# Patient Record
Sex: MALE | Race: BLACK OR AFRICAN AMERICAN | NOT HISPANIC OR LATINO
[De-identification: names, ages, dates, MRNs, and addresses within clinical notes are randomized per-mention and may not be internally consistent; named-entity substitution may affect disease eponyms.]

---

## 2017-03-25 ENCOUNTER — RX RENEWAL (OUTPATIENT)
Age: 57
End: 2017-03-25

## 2017-09-29 ENCOUNTER — RX RENEWAL (OUTPATIENT)
Age: 57
End: 2017-09-29

## 2018-01-22 ENCOUNTER — RX RENEWAL (OUTPATIENT)
Age: 58
End: 2018-01-22

## 2018-11-16 ENCOUNTER — APPOINTMENT (OUTPATIENT)
Dept: FAMILY MEDICINE | Facility: HOSPITAL | Age: 58
End: 2018-11-16

## 2018-11-16 ENCOUNTER — LABORATORY RESULT (OUTPATIENT)
Age: 58
End: 2018-11-16

## 2018-11-16 ENCOUNTER — OUTPATIENT (OUTPATIENT)
Dept: OUTPATIENT SERVICES | Facility: HOSPITAL | Age: 58
LOS: 1 days | End: 2018-11-16
Payer: COMMERCIAL

## 2018-11-16 VITALS
BODY MASS INDEX: 36.04 KG/M2 | SYSTOLIC BLOOD PRESSURE: 173 MMHG | OXYGEN SATURATION: 98 % | RESPIRATION RATE: 14 BRPM | DIASTOLIC BLOOD PRESSURE: 93 MMHG | HEART RATE: 98 BPM | TEMPERATURE: 97.2 F | WEIGHT: 237 LBS

## 2018-11-16 VITALS — SYSTOLIC BLOOD PRESSURE: 150 MMHG | DIASTOLIC BLOOD PRESSURE: 80 MMHG

## 2018-11-16 DIAGNOSIS — Z00.00 ENCOUNTER FOR GENERAL ADULT MEDICAL EXAMINATION W/OUT ABNORMAL FINDINGS: ICD-10-CM

## 2018-11-16 DIAGNOSIS — Z00.00 ENCOUNTER FOR GENERAL ADULT MEDICAL EXAMINATION WITHOUT ABNORMAL FINDINGS: ICD-10-CM

## 2018-11-16 PROCEDURE — 86803 HEPATITIS C AB TEST: CPT

## 2018-11-16 PROCEDURE — 80053 COMPREHEN METABOLIC PANEL: CPT

## 2018-11-16 PROCEDURE — 84439 ASSAY OF FREE THYROXINE: CPT

## 2018-11-16 PROCEDURE — 82274 ASSAY TEST FOR BLOOD FECAL: CPT

## 2018-11-16 PROCEDURE — 84443 ASSAY THYROID STIM HORMONE: CPT

## 2018-11-16 PROCEDURE — G0463: CPT

## 2018-11-16 PROCEDURE — 80061 LIPID PANEL: CPT

## 2018-11-16 PROCEDURE — 90471 IMMUNIZATION ADMIN: CPT

## 2018-11-16 PROCEDURE — 83036 HEMOGLOBIN GLYCOSYLATED A1C: CPT

## 2018-11-16 NOTE — HISTORY OF PRESENT ILLNESS
[FreeTextEntry1] : annual visit [de-identified] : Pt is a 59 yo M w/ hx of htn and prediabetes who is here for his annual visit and a refill of his blood pressure medications. Pt states that he ran out of his medication last week and did not take them for two days and was able to get some from the pharmacy until he went to see his PCP. The pt restarted taking his medications 3 days go. Pt denies any headaches or any other symptoms of hypertension at this time. PT asked about testing for prostate cancer and explained that we usually do not do PSA as it can lead to more invasive procedures that end up being negative. Pt denies any symptoms of dribbling or difficulty urinating that would suggest prostate cancer. Explained that the risk outweigh the benefits and it isn't necessary since he is not experiencing any symptoms. The pt agreed not to do a PSA at this time.

## 2018-11-16 NOTE — PHYSICAL EXAM
[No Acute Distress] : no acute distress [Well Nourished] : well nourished [Well Developed] : well developed [Well-Appearing] : well-appearing [Normal Sclera/Conjunctiva] : normal sclera/conjunctiva [PERRL] : pupils equal round and reactive to light [Normal Oropharynx] : the oropharynx was normal [No Lymphadenopathy] : no lymphadenopathy [Fluctuant] : was fluctuant [Mass ___cm] : had a [unfilled]Ucm mass [Rubbery] : had a rubbery consistency [___] : a single ~M [unfilled] ~Ucm nodule palpated on the right lobe of the thyroid [No Respiratory Distress] : no respiratory distress  [Clear to Auscultation] : lungs were clear to auscultation bilaterally [No Accessory Muscle Use] : no accessory muscle use [Normal Rate] : normal rate  [Regular Rhythm] : with a regular rhythm [Normal S1, S2] : normal S1 and S2 [No Murmur] : no murmur heard [No Edema] : there was no peripheral edema [Soft] : abdomen soft [Non Tender] : non-tender [Non-distended] : non-distended [No Masses] : no abdominal mass palpated [Normal Posterior Cervical Nodes] : no posterior cervical lymphadenopathy [Normal Anterior Cervical Nodes] : no anterior cervical lymphadenopathy [Grossly Normal Strength/Tone] : grossly normal strength/tone [Normal Gait] : normal gait [Coordination Grossly Intact] : coordination grossly intact [No Focal Deficits] : no focal deficits [Normal Affect] : the affect was normal [Normal Insight/Judgement] : insight and judgment were intact [de-identified] : Thyroid nodule noted on the right side of the neck

## 2018-11-16 NOTE — ASSESSMENT
[FreeTextEntry1] : 59 yo M w/ hx of HTN and prediabetes who is here for an annual visit and was found to have a thyroid nodule\par Thyroid nodule\par -TSH and free T4\par -US of the thyroid\par \par HTN\par -Pt with blood pressure initially 173/93 with a repeat of 150/80 manually\par -refilled pt's amlodipine and losartan\par -follow up in 1 month to recheck blood pressure. If still elevated will consider adding another blood pressure medication.\par \par Health maintenance\par -CBC, CMP, lipid profile, hep c ab, hemoglobin A1c, FIT test\par \par Flu vaccine needed\par -flu vaccine offered and pt declined\par \par HIV screening\par offered and pt declined\par

## 2018-11-19 ENCOUNTER — FORM ENCOUNTER (OUTPATIENT)
Age: 58
End: 2018-11-19

## 2018-11-19 DIAGNOSIS — E04.1 NONTOXIC SINGLE THYROID NODULE: ICD-10-CM

## 2018-11-19 DIAGNOSIS — Z23 ENCOUNTER FOR IMMUNIZATION: ICD-10-CM

## 2018-11-19 LAB
CHOLEST SERPL-MCNC: 167 MG/DL
CHOLEST/HDLC SERPL: 3.5 RATIO
HCV AB SER QL: NONREACTIVE
HCV S/CO RATIO: 0.19 S/CO
HDLC SERPL-MCNC: 48 MG/DL
LDLC SERPL CALC-MCNC: 101 MG/DL
TRIGL SERPL-MCNC: 89 MG/DL

## 2018-11-20 ENCOUNTER — OUTPATIENT (OUTPATIENT)
Dept: OUTPATIENT SERVICES | Facility: HOSPITAL | Age: 58
LOS: 1 days | End: 2018-11-20
Payer: COMMERCIAL

## 2018-11-20 DIAGNOSIS — E04.1 NONTOXIC SINGLE THYROID NODULE: ICD-10-CM

## 2018-11-20 PROCEDURE — 76536 US EXAM OF HEAD AND NECK: CPT

## 2018-11-20 PROCEDURE — 76536 US EXAM OF HEAD AND NECK: CPT | Mod: 26

## 2018-12-02 LAB
ALBUMIN SERPL ELPH-MCNC: 4.7 G/DL
ALP BLD-CCNC: 50 U/L
ALT SERPL-CCNC: 14 U/L
ANION GAP SERPL CALC-SCNC: 14 MMOL/L
AST SERPL-CCNC: 18 U/L
BASOPHILS # BLD AUTO: 0.01 K/UL
BASOPHILS NFR BLD AUTO: 0.3 %
BILIRUB SERPL-MCNC: 0.7 MG/DL
BUN SERPL-MCNC: 16 MG/DL
CALCIUM SERPL-MCNC: 9.3 MG/DL
CHLORIDE SERPL-SCNC: 102 MMOL/L
CO2 SERPL-SCNC: 26 MMOL/L
CREAT SERPL-MCNC: 0.93 MG/DL
EOSINOPHIL # BLD AUTO: 0.09 K/UL
EOSINOPHIL NFR BLD AUTO: 2.6 %
GLUCOSE SERPL-MCNC: 114 MG/DL
HBA1C MFR BLD HPLC: 6.2 %
HCT VFR BLD CALC: 40.4 %
HEMOCCULT STL QL IA: NEGATIVE
HGB BLD-MCNC: 13.1 G/DL
IMM GRANULOCYTES NFR BLD AUTO: 0 %
LYMPHOCYTES # BLD AUTO: 1.43 K/UL
LYMPHOCYTES NFR BLD AUTO: 41.4 %
MAN DIFF?: NORMAL
MCHC RBC-ENTMCNC: 28.5 PG
MCHC RBC-ENTMCNC: 32.4 GM/DL
MCV RBC AUTO: 88 FL
MONOCYTES # BLD AUTO: 0.3 K/UL
MONOCYTES NFR BLD AUTO: 8.7 %
NEUTROPHILS # BLD AUTO: 1.62 K/UL
NEUTROPHILS NFR BLD AUTO: 47 %
PLATELET # BLD AUTO: 287 K/UL
POTASSIUM SERPL-SCNC: 3.9 MMOL/L
PROT SERPL-MCNC: 7.4 G/DL
RBC # BLD: 4.59 M/UL
RBC # FLD: 13.6 %
SODIUM SERPL-SCNC: 142 MMOL/L
TSH SERPL-ACNC: 0.18 UIU/ML
WBC # FLD AUTO: 3.45 K/UL

## 2018-12-17 ENCOUNTER — APPOINTMENT (OUTPATIENT)
Dept: FAMILY MEDICINE | Facility: HOSPITAL | Age: 58
End: 2018-12-17

## 2019-07-02 ENCOUNTER — APPOINTMENT (OUTPATIENT)
Dept: FAMILY MEDICINE | Facility: HOSPITAL | Age: 59
End: 2019-07-02

## 2019-07-02 ENCOUNTER — OUTPATIENT (OUTPATIENT)
Dept: OUTPATIENT SERVICES | Facility: HOSPITAL | Age: 59
LOS: 1 days | End: 2019-07-02
Payer: COMMERCIAL

## 2019-07-02 VITALS
OXYGEN SATURATION: 97 % | HEIGHT: 68 IN | WEIGHT: 247 LBS | BODY MASS INDEX: 37.44 KG/M2 | TEMPERATURE: 98.3 F | SYSTOLIC BLOOD PRESSURE: 163 MMHG | HEART RATE: 88 BPM | RESPIRATION RATE: 14 BRPM | DIASTOLIC BLOOD PRESSURE: 93 MMHG

## 2019-07-02 DIAGNOSIS — Z00.00 ENCOUNTER FOR GENERAL ADULT MEDICAL EXAMINATION WITHOUT ABNORMAL FINDINGS: ICD-10-CM

## 2019-07-02 PROCEDURE — G0463: CPT

## 2019-07-02 NOTE — ASSESSMENT
[FreeTextEntry1] : 59M w/ prediabetes, HTN, and thyroid nodule\par Thyroid nodule\par -US of thyroid showed enlarged nodule of the right lobe and recommend Us guided Needle biopsy\par -endocrine referral given to order biopsy\par \par HTN\par -blood pressure today 163/93\par -Pt ran out of amlodipine and losartan\par -refilled prescription and rtc in 2 weeks for bp follow up\par \par RTC in 2 weeks\par Discussed w/ Dr. Lim

## 2019-07-02 NOTE — PHYSICAL EXAM
[Well Nourished] : well nourished [Well Developed] : well developed [No Acute Distress] : no acute distress [PERRL] : pupils equal round and reactive to light [Normal Sclera/Conjunctiva] : normal sclera/conjunctiva [No Lymphadenopathy] : no lymphadenopathy [Fluctuant] : was fluctuant [Rubbery] : had a rubbery consistency [Mass ___cm] : had a [unfilled]Ucm mass [Normal Rate] : normal rate  [___] : a single ~M [unfilled] ~Ucm nodule palpated on the right lobe of the thyroid [Regular Rhythm] : with a regular rhythm [Normal S1, S2] : normal S1 and S2 [No Focal Deficits] : no focal deficits [Coordination Grossly Intact] : coordination grossly intact [Normal Gait] : normal gait [Normal Insight/Judgement] : insight and judgment were intact [Normal Affect] : the affect was normal

## 2019-07-02 NOTE — HISTORY OF PRESENT ILLNESS
[de-identified] : 59M w/ hx of prediabetes, HTN, and thyroid nodule who is here for refills of his blood pressure medication. PT is aware that he was suppose to go to the endocrinologist for his nodule after he got the thyroid scan but stated that he had many things going on in his life including going to the super bowl game and did not want to deal with the nodule. Pt states that he is ready to go to the endocrinologist as the nodule feels like it is getting larger, and stated that he will be  back in 2 weeks to have his blood pressure rechecked as it was a bit elevated. Of note the patient ran out of his medication today and was not able to take his blood pressure meds this morning. Pt denies any headache, chest pain, palpitations, nausea, vomiting, constipation, or urinary symptoms. Pt states he did gain 10 lbs since his last visit. Pt has no complaints.

## 2019-07-03 DIAGNOSIS — E04.1 NONTOXIC SINGLE THYROID NODULE: ICD-10-CM

## 2019-07-03 DIAGNOSIS — I10 ESSENTIAL (PRIMARY) HYPERTENSION: ICD-10-CM

## 2019-07-17 ENCOUNTER — APPOINTMENT (OUTPATIENT)
Dept: FAMILY MEDICINE | Facility: HOSPITAL | Age: 59
End: 2019-07-17

## 2019-07-17 ENCOUNTER — OUTPATIENT (OUTPATIENT)
Dept: OUTPATIENT SERVICES | Facility: HOSPITAL | Age: 59
LOS: 1 days | End: 2019-07-17
Payer: COMMERCIAL

## 2019-07-17 VITALS
OXYGEN SATURATION: 96 % | TEMPERATURE: 97.9 F | SYSTOLIC BLOOD PRESSURE: 140 MMHG | RESPIRATION RATE: 16 BRPM | HEIGHT: 68 IN | DIASTOLIC BLOOD PRESSURE: 80 MMHG | BODY MASS INDEX: 36.68 KG/M2 | WEIGHT: 242 LBS | HEART RATE: 93 BPM

## 2019-07-17 DIAGNOSIS — Z00.00 ENCOUNTER FOR GENERAL ADULT MEDICAL EXAMINATION WITHOUT ABNORMAL FINDINGS: ICD-10-CM

## 2019-07-17 PROCEDURE — G0463: CPT

## 2019-07-17 NOTE — HISTORY OF PRESENT ILLNESS
[Hypertension] : Hypertension [Patient was last seen on ___] : Patient was last seen on [unfilled] [Does not check BP] : The patient is not checking blood pressure [<140/90] : Target blood pressure is <140/90 [Near target goal] : BP near target goal [FreeTextEntry6] : 59M w/ hx of HTN, prediabetes, and thyroid nodule who is here for a blood pressure follow up. Pt denies any chest pain, headache, dizziness, or SOB. Pt has no complaints at this time

## 2019-07-17 NOTE — PLAN
[FreeTextEntry1] : Hypertension\par -Pt's blood pressure is well controlled after refills of medications\par -continue current regimen of amlodipine and losartan\par -encourage the DASH diet and exercise\par -rtc in 3 months for blood presssure follow up\par \par Discussed w/

## 2019-07-17 NOTE — ASSESSMENT
[FreeTextEntry1] : 59M w/ htn, prediabetes, and thyroid nodule\par HTN\par -well controlled\par -continue current regimen of amlodipine and losartan\par -rtc in 3months for follow up\par \par Thyroid nodule\par -follow up with endocrine\par -pt has an appointment in august.\par \par Discussed w/ Dr. Kirkland

## 2019-07-17 NOTE — PHYSICAL EXAM
[No JVD] : no jugular venous distention [No Lymphadenopathy] : no lymphadenopathy [Normal] : normal rate, regular rhythm, normal S1 and S2 and no murmur heard [de-identified] : Right thyroid nodule approximately 1.5cm

## 2019-07-18 DIAGNOSIS — I10 ESSENTIAL (PRIMARY) HYPERTENSION: ICD-10-CM

## 2019-12-30 ENCOUNTER — RX RENEWAL (OUTPATIENT)
Age: 59
End: 2019-12-30

## 2021-04-08 ENCOUNTER — RX RENEWAL (OUTPATIENT)
Age: 61
End: 2021-04-08

## 2021-04-17 ENCOUNTER — RX RENEWAL (OUTPATIENT)
Age: 61
End: 2021-04-17

## 2021-05-28 ENCOUNTER — APPOINTMENT (OUTPATIENT)
Dept: FAMILY MEDICINE | Facility: HOSPITAL | Age: 61
End: 2021-05-28

## 2021-05-28 ENCOUNTER — OUTPATIENT (OUTPATIENT)
Dept: OUTPATIENT SERVICES | Facility: HOSPITAL | Age: 61
LOS: 1 days | End: 2021-05-28
Payer: COMMERCIAL

## 2021-05-28 VITALS
WEIGHT: 250 LBS | TEMPERATURE: 97.9 F | OXYGEN SATURATION: 98 % | DIASTOLIC BLOOD PRESSURE: 104 MMHG | SYSTOLIC BLOOD PRESSURE: 188 MMHG | BODY MASS INDEX: 38.01 KG/M2 | HEART RATE: 90 BPM | RESPIRATION RATE: 18 BRPM

## 2021-05-28 VITALS — DIASTOLIC BLOOD PRESSURE: 93 MMHG | SYSTOLIC BLOOD PRESSURE: 184 MMHG

## 2021-05-28 DIAGNOSIS — N52.9 MALE ERECTILE DYSFUNCTION, UNSPECIFIED: ICD-10-CM

## 2021-05-28 DIAGNOSIS — Z00.00 ENCOUNTER FOR GENERAL ADULT MEDICAL EXAMINATION WITHOUT ABNORMAL FINDINGS: ICD-10-CM

## 2021-05-28 PROCEDURE — 80053 COMPREHEN METABOLIC PANEL: CPT

## 2021-05-28 PROCEDURE — 36415 COLL VENOUS BLD VENIPUNCTURE: CPT

## 2021-05-28 PROCEDURE — 84443 ASSAY THYROID STIM HORMONE: CPT

## 2021-05-28 PROCEDURE — 83036 HEMOGLOBIN GLYCOSYLATED A1C: CPT

## 2021-05-28 PROCEDURE — 82306 VITAMIN D 25 HYDROXY: CPT

## 2021-05-28 PROCEDURE — 84436 ASSAY OF TOTAL THYROXINE: CPT

## 2021-05-28 PROCEDURE — 85025 COMPLETE CBC W/AUTO DIFF WBC: CPT

## 2021-05-28 PROCEDURE — G0463: CPT

## 2021-05-29 DIAGNOSIS — E66.9 OBESITY, UNSPECIFIED: ICD-10-CM

## 2021-05-29 DIAGNOSIS — R73.03 PREDIABETES: ICD-10-CM

## 2021-05-29 DIAGNOSIS — I10 ESSENTIAL (PRIMARY) HYPERTENSION: ICD-10-CM

## 2021-05-30 NOTE — HISTORY OF PRESENT ILLNESS
[de-identified] : 61 y/o M with hx of thyroid nodule, who presents here for follow up. Patient says he has been feeling a burning sensation in his penis after urinating, denies trouble starting his stream, but feels his voiding is not complete. Patient has also noticed swelling in his left side of his testes. Patient used to work two jobs, and now only has one job, and feels like he doesn’t know what to do with himself, now that he is more free. Patient has not been exercising or doing what he normally likes to do. Patient has felt bad because he has not gone to the clinic in more than two years, and his family has been pushing him to come to the doctor.

## 2021-05-30 NOTE — PLAN
[FreeTextEntry1] : #Thyroid Nodule\par TSH\par T4\par Thyroid Ultrasound\par \par #Left Testicular swelling\par Has been occuring for 3-4 weeks according to patient \par Hydrocele vs Hernia vs Mass\par Testicular U/s\par Urology consult\par \par #Hypertension\par 's\par Patient without meds for over 2 weeks\par Losartan HCTZ ordered\par Amlodipine ordered\par Return to clinic in one week for BP check \par \par #Health Maintenance\par CBC\par CMP\par A1C\par Return to clinic for discussion of colonoscopy \par \par #Dysuria\par Occurs at the end of his stream\par Urinalysis\par \par #Mild Depression\par Return in one week for follow up\par Will consider sw consult \par \par #Pre-diabetes\par A1C pending \par \par #Flu Vaccine\par Patient deferred at this time because he took the COVID vaccine 1.5 weeks ago \par \par #Insufficient voiding\par Urology consult\par Will f/u at next visit\par \par #Erectile Dysfunction\par Patient has intermittent morning erections\par Patient reports issues maintaining erections\par \par #Decreased Hearing\par Likely due to history of being a  \par Will address at next visit \par Likely will need audiometry referral\par \par Discussed with Dr. Bazzi \par

## 2021-05-30 NOTE — REVIEW OF SYSTEMS
[Impotence] : impotence [Anxiety] : anxiety [Fever] : no fever [Chills] : no chills [Discharge] : no discharge [Pain] : no pain [Earache] : no earache [Hearing Loss] : no hearing loss [Chest Pain] : no chest pain [Palpitations] : no palpitations [Shortness Of Breath] : no shortness of breath [Wheezing] : no wheezing [Abdominal Pain] : no abdominal pain [Nausea] : no nausea [Constipation] : no constipation [Diarrhea] : no diarrhea [Dysuria] : no dysuria [Incontinence] : no incontinence [Joint Pain] : no joint pain [Itching] : no itching [Headache] : no headache [Dizziness] : no dizziness [Suicidal] : not suicidal [Insomnia] : no insomnia [Depression] : no depression [FreeTextEntry8] : at times he does not have an erection in the morning  [FreeTextEntry4] : patient notes decreased hearing bilaterally.

## 2021-05-30 NOTE — PHYSICAL EXAM
[No Acute Distress] : no acute distress [Well Nourished] : well nourished [Well Developed] : well developed [Well-Appearing] : well-appearing [Normal] : no acute distress, well nourished, well developed and well-appearing [Normal Sclera/Conjunctiva] : normal sclera/conjunctiva [PERRL] : pupils equal round and reactive to light [EOMI] : extraocular movements intact [Fundoscopic Exam Performed] : fundoscopic ~T exam ~C was performed [Normal Outer Ear/Nose] : the outer ears and nose were normal in appearance [Normal Oropharynx] : the oropharynx was normal [No JVD] : no jugular venous distention [No Lymphadenopathy] : no lymphadenopathy [Supple] : supple [No Respiratory Distress] : no respiratory distress  [No Accessory Muscle Use] : no accessory muscle use [Clear to Auscultation] : lungs were clear to auscultation bilaterally [Normal Rate] : normal rate  [Regular Rhythm] : with a regular rhythm [Normal S1, S2] : normal S1 and S2 [S4] : an S4 was heard [No Abdominal Bruit] : a ~M bruit was not heard ~T in the abdomen [No Edema] : there was no peripheral edema [Soft] : abdomen soft [Non Tender] : non-tender [Non-distended] : non-distended [No HSM] : no HSM [Normal Bowel Sounds] : normal bowel sounds [Tight] : was tight [Normal Anterior Cervical Nodes] : no anterior cervical lymphadenopathy [No CVA Tenderness] : no CVA  tenderness [No Spinal Tenderness] : no spinal tenderness [No Joint Swelling] : no joint swelling [Grossly Normal Strength/Tone] : grossly normal strength/tone [No Rash] : no rash [Coordination Grossly Intact] : coordination grossly intact [No Focal Deficits] : no focal deficits [Normal Gait] : normal gait [Deep Tendon Reflexes (DTR)] : deep tendon reflexes were 2+ and symmetric [Normal Affect] : the affect was normal [Alert and Oriented x3] : oriented to person, place, and time [Normal Insight/Judgement] : insight and judgment were intact [de-identified] : murmur did not radiate to the carotids  [de-identified] : Skin tag noted on Right side. Compressible. Likely bengn. Considerable size, about the size of a singh root.

## 2021-06-01 LAB
25(OH)D3 SERPL-MCNC: 16.1 NG/ML
ALBUMIN SERPL ELPH-MCNC: 4.9 G/DL
ALP BLD-CCNC: 69 U/L
ALT SERPL-CCNC: 16 U/L
ANION GAP SERPL CALC-SCNC: 13 MMOL/L
AST SERPL-CCNC: 12 U/L
BASOPHILS # BLD AUTO: 0.02 K/UL
BASOPHILS NFR BLD AUTO: 0.4 %
BILIRUB SERPL-MCNC: 0.3 MG/DL
BUN SERPL-MCNC: 16 MG/DL
CALCIUM SERPL-MCNC: 9.7 MG/DL
CHLORIDE SERPL-SCNC: 102 MMOL/L
CO2 SERPL-SCNC: 27 MMOL/L
CREAT SERPL-MCNC: 1.01 MG/DL
EOSINOPHIL # BLD AUTO: 0.12 K/UL
EOSINOPHIL NFR BLD AUTO: 2.6 %
ESTIMATED AVERAGE GLUCOSE: 140 MG/DL
GLUCOSE SERPL-MCNC: 117 MG/DL
HBA1C MFR BLD HPLC: 6.5 %
HCT VFR BLD CALC: 41.3 %
HGB BLD-MCNC: 13.3 G/DL
IMM GRANULOCYTES NFR BLD AUTO: 0 %
LYMPHOCYTES # BLD AUTO: 1.57 K/UL
LYMPHOCYTES NFR BLD AUTO: 34.4 %
MAN DIFF?: NORMAL
MCHC RBC-ENTMCNC: 28.8 PG
MCHC RBC-ENTMCNC: 32.2 GM/DL
MCV RBC AUTO: 89.4 FL
MONOCYTES # BLD AUTO: 0.34 K/UL
MONOCYTES NFR BLD AUTO: 7.5 %
NEUTROPHILS # BLD AUTO: 2.51 K/UL
NEUTROPHILS NFR BLD AUTO: 55.1 %
PLATELET # BLD AUTO: 303 K/UL
POTASSIUM SERPL-SCNC: 3.7 MMOL/L
PROT SERPL-MCNC: 7.7 G/DL
RBC # BLD: 4.62 M/UL
RBC # FLD: 13.2 %
SODIUM SERPL-SCNC: 142 MMOL/L
T4 SERPL-MCNC: 9 UG/DL
TSH SERPL-ACNC: 0.46 UIU/ML
WBC # FLD AUTO: 4.56 K/UL

## 2021-06-03 ENCOUNTER — APPOINTMENT (OUTPATIENT)
Dept: FAMILY MEDICINE | Facility: HOSPITAL | Age: 61
End: 2021-06-03

## 2021-06-14 ENCOUNTER — OUTPATIENT (OUTPATIENT)
Dept: OUTPATIENT SERVICES | Facility: HOSPITAL | Age: 61
LOS: 1 days | End: 2021-06-14
Payer: COMMERCIAL

## 2021-06-14 ENCOUNTER — RESULT REVIEW (OUTPATIENT)
Age: 61
End: 2021-06-14

## 2021-06-14 DIAGNOSIS — N50.89 OTHER SPECIFIED DISORDERS OF THE MALE GENITAL ORGANS: ICD-10-CM

## 2021-06-14 DIAGNOSIS — E04.1 NONTOXIC SINGLE THYROID NODULE: ICD-10-CM

## 2021-06-14 PROCEDURE — 76870 US EXAM SCROTUM: CPT | Mod: 26

## 2021-06-14 PROCEDURE — 76536 US EXAM OF HEAD AND NECK: CPT | Mod: 26

## 2021-06-14 PROCEDURE — 76870 US EXAM SCROTUM: CPT

## 2021-06-14 PROCEDURE — 76536 US EXAM OF HEAD AND NECK: CPT

## 2021-06-28 ENCOUNTER — OUTPATIENT (OUTPATIENT)
Dept: OUTPATIENT SERVICES | Facility: HOSPITAL | Age: 61
LOS: 1 days | End: 2021-06-28
Payer: COMMERCIAL

## 2021-06-28 ENCOUNTER — APPOINTMENT (OUTPATIENT)
Dept: FAMILY MEDICINE | Facility: HOSPITAL | Age: 61
End: 2021-06-28

## 2021-06-28 VITALS
SYSTOLIC BLOOD PRESSURE: 161 MMHG | TEMPERATURE: 97.3 F | HEART RATE: 89 BPM | DIASTOLIC BLOOD PRESSURE: 79 MMHG | HEIGHT: 68 IN | OXYGEN SATURATION: 96 % | RESPIRATION RATE: 18 BRPM | WEIGHT: 247 LBS | BODY MASS INDEX: 37.44 KG/M2

## 2021-06-28 DIAGNOSIS — N40.0 BENIGN PROSTATIC HYPERPLASIA WITHOUT LOWER URINARY TRACT SYMPMS: ICD-10-CM

## 2021-06-28 DIAGNOSIS — M54.9 DORSALGIA, UNSPECIFIED: ICD-10-CM

## 2021-06-28 DIAGNOSIS — Z00.00 ENCOUNTER FOR GENERAL ADULT MEDICAL EXAMINATION WITHOUT ABNORMAL FINDINGS: ICD-10-CM

## 2021-06-28 PROCEDURE — G0463: CPT

## 2021-06-28 NOTE — PHYSICAL EXAM
[Normal Sclera/Conjunctiva] : normal sclera/conjunctiva [PERRL] : pupils equal round and reactive to light [Normal Outer Ear/Nose] : the outer ears and nose were normal in appearance [Normal Oropharynx] : the oropharynx was normal [No Lymphadenopathy] : no lymphadenopathy [No Respiratory Distress] : no respiratory distress  [No Accessory Muscle Use] : no accessory muscle use [Clear to Auscultation] : lungs were clear to auscultation bilaterally [Normal Rate] : normal rate  [Regular Rhythm] : with a regular rhythm [Normal S1, S2] : normal S1 and S2 [Non Tender] : non-tender [Normal Bowel Sounds] : normal bowel sounds [Coordination Grossly Intact] : coordination grossly intact [No Focal Deficits] : no focal deficits [Normal Gait] : normal gait [Normal Affect] : the affect was normal

## 2021-06-30 DIAGNOSIS — N40.0 BENIGN PROSTATIC HYPERPLASIA WITHOUT LOWER URINARY TRACT SYMPTOMS: ICD-10-CM

## 2021-06-30 DIAGNOSIS — I10 ESSENTIAL (PRIMARY) HYPERTENSION: ICD-10-CM

## 2021-06-30 DIAGNOSIS — M54.9 DORSALGIA, UNSPECIFIED: ICD-10-CM

## 2021-06-30 DIAGNOSIS — E04.1 NONTOXIC SINGLE THYROID NODULE: ICD-10-CM

## 2021-06-30 DIAGNOSIS — E66.9 OBESITY, UNSPECIFIED: ICD-10-CM

## 2021-06-30 NOTE — HISTORY OF PRESENT ILLNESS
[Hypertension] : Hypertension [Diabetes Mellitus] : Diabetes Mellitus [Obesity] : Obesity [Patient was last seen on ___] : Patient was last seen on [unfilled] [Does not check BP] : The patient is not checking blood pressure [<140/90] : Target blood pressure is <140/90 [Target goal not met] : BP target goal not met [Weight Loss ___ Pounds] : Weight loss of [unfilled] pounds [Following Diet Successfully] : Following the diet successfully [Sedentary] : Patient is sedentary [Contemplation] : Contemplation: patient is considering to lose weight within the next 6 months, patient did not attempt to lose weight in the last year. [FreeTextEntry6] : Patient is a 60 y/o M with hx of hypertension, obesity, Left hydrocele. Patient recently received a thyroid u/s, which showed increased growth of the nodule. Patient recently took a vacation with his family, which has improved his mental state. Patient says he has tried to improve his lunch habits, by removing cold cuts from his diet, and now eats vegetables and cucumbers. Patient reports no issues taking his medication appropriately. Patient says he noticed that his urinary tract is affected, because he still has some leakage, and doesn’t feel like he is emptying his bladder appropriately. Patient says he has also noticed back pain, for which he was taking NSAIDs for. 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 He says he does not use a pillow during that time. Patient says he received an xray for his back over 10 years ago, and was told he had inflammation in his back. Patient says the pain is relieved by rest, stretching. Patient says the pain stays over his lower back, and does not radiate. patient says the pain gets to a 9/10. At rest, the pain goes down to a 1/10.  [de-identified] : patient has a BP cuff. However it is old, says he will purchase a new one at [facility].

## 2021-06-30 NOTE — REVIEW OF SYSTEMS
[Fever] : no fever [Chills] : no chills [Discharge] : no discharge [Earache] : no earache [Chest Pain] : no chest pain [Palpitations] : no palpitations [Shortness Of Breath] : no shortness of breath [Joint Pain] : no joint pain [Joint Stiffness] : no joint stiffness [Itching] : no itching [Headache] : no headache [Dizziness] : no dizziness [Anxiety] : no anxiety [Depression] : no depression

## 2021-06-30 NOTE — PLAN
[FreeTextEntry1] : #Difficulty voiding\par patient says he has noticed difficulty voiding\par patient to make appointment for urology\par \par #Thyroid nodule\par U/S shows thyroid nodule has increased\par patient to need fna for thyroid nodule\par \par #HTN\par BP today 161/79 improved from 184/93\par WIll increase norvasc to 10mg \par \par #Diabetes\par Metformin 500mg \par Pamphlet given to patient regarding metformin \par \par #Return to Clinic \par In one week

## 2021-07-06 ENCOUNTER — OUTPATIENT (OUTPATIENT)
Dept: OUTPATIENT SERVICES | Facility: HOSPITAL | Age: 61
LOS: 1 days | End: 2021-07-06
Payer: COMMERCIAL

## 2021-07-06 ENCOUNTER — MED ADMIN CHARGE (OUTPATIENT)
Age: 61
End: 2021-07-06

## 2021-07-06 ENCOUNTER — APPOINTMENT (OUTPATIENT)
Dept: FAMILY MEDICINE | Facility: HOSPITAL | Age: 61
End: 2021-07-06

## 2021-07-06 VITALS
BODY MASS INDEX: 37.74 KG/M2 | OXYGEN SATURATION: 99 % | HEIGHT: 68 IN | TEMPERATURE: 96 F | SYSTOLIC BLOOD PRESSURE: 133 MMHG | RESPIRATION RATE: 18 BRPM | DIASTOLIC BLOOD PRESSURE: 86 MMHG | HEART RATE: 96 BPM | WEIGHT: 249 LBS

## 2021-07-06 DIAGNOSIS — Z00.00 ENCOUNTER FOR GENERAL ADULT MEDICAL EXAMINATION WITHOUT ABNORMAL FINDINGS: ICD-10-CM

## 2021-07-06 PROCEDURE — 82274 ASSAY TEST FOR BLOOD FECAL: CPT

## 2021-07-06 PROCEDURE — 82043 UR ALBUMIN QUANTITATIVE: CPT

## 2021-07-06 PROCEDURE — G0463: CPT

## 2021-07-06 PROCEDURE — 80061 LIPID PANEL: CPT

## 2021-07-06 NOTE — PLAN
[FreeTextEntry1] : #Diabetes\par optho referral\par Metformin 500mg qd\par Microalbumin/Cr\par Healthy Living referral given to patient\par Pneumonia Vaccine given\par TDAP vaccine given \par \par #HTN\par 10mg Norvasc\par Patient to return for BP check

## 2021-07-06 NOTE — PHYSICAL EXAM
[No Acute Distress] : no acute distress [Well Nourished] : well nourished [Well Developed] : well developed [Well-Appearing] : well-appearing [Normal Sclera/Conjunctiva] : normal sclera/conjunctiva [PERRL] : pupils equal round and reactive to light [EOMI] : extraocular movements intact [Fundoscopic Exam Performed] : fundoscopic ~T exam ~C was performed [Normal Outer Ear/Nose] : the outer ears and nose were normal in appearance [Normal Oropharynx] : the oropharynx was normal [Normal TMs] : both tympanic membranes were normal [No JVD] : no jugular venous distention [No Lymphadenopathy] : no lymphadenopathy [Supple] : supple [Thyroid Normal, No Nodules] : the thyroid was normal and there were no nodules present [No Respiratory Distress] : no respiratory distress  [No Accessory Muscle Use] : no accessory muscle use [Clear to Auscultation] : lungs were clear to auscultation bilaterally [Normal Rate] : normal rate  [Regular Rhythm] : with a regular rhythm [Normal S1, S2] : normal S1 and S2 [No Edema] : there was no peripheral edema [No HSM] : no HSM [Normal Anterior Cervical Nodes] : no anterior cervical lymphadenopathy [No CVA Tenderness] : no CVA  tenderness [No Spinal Tenderness] : no spinal tenderness [No Joint Swelling] : no joint swelling [Grossly Normal Strength/Tone] : grossly normal strength/tone [No Rash] : no rash [Coordination Grossly Intact] : coordination grossly intact

## 2021-07-07 LAB
CREAT SPEC-SCNC: 213 MG/DL
MICROALBUMIN 24H UR DL<=1MG/L-MCNC: <1.2 MG/DL
MICROALBUMIN/CREAT 24H UR-RTO: NORMAL MG/G

## 2021-07-07 NOTE — HISTORY OF PRESENT ILLNESS
[Diabetes Mellitus] : Diabetes Mellitus [Hypertension] : Hypertension [Patient was last seen on ___] : Patient was last seen on [unfilled] [No episodes] : No hypoglycemic episodes since the last visit. [Does not check] : Patient does not check blood glucose regularly [Most Recent A1C: ___] : Most recent A1C was [unfilled] [Does not check BP] : The patient is not checking blood pressure [<130/90] : Target blood pressure is  <130/90 [Near target goal] : BP near target goal [FreeTextEntry6] : Patient says he has no issue taking his medications appropriately.  [de-identified] : patient was taking 15mg of norvasc, as he was unsure to switch to 10mg vs his 5 mg dose.

## 2021-07-07 NOTE — REVIEW OF SYSTEMS
[Fever] : no fever [Discharge] : no discharge [Earache] : no earache [Hearing Loss] : no hearing loss [Chest Pain] : no chest pain [Palpitations] : no palpitations [Claudication] : no  leg claudication [Lower Ext Edema] : no lower extremity edema [Shortness Of Breath] : no shortness of breath [Wheezing] : no wheezing [Cough] : no cough [Dyspnea on Exertion] : not dyspnea on exertion [Abdominal Pain] : no abdominal pain [Nausea] : no nausea [Constipation] : no constipation [Dysuria] : no dysuria [Joint Pain] : no joint pain [Joint Stiffness] : no joint stiffness [Itching] : no itching [Mole Changes] : no mole changes [Nail Changes] : no nail changes [Hair Changes] : no hair changes [Headache] : no headache [Dizziness] : no dizziness [Fainting] : no fainting [Confusion] : no confusion [Insomnia] : no insomnia [Anxiety] : no anxiety [Depression] : no depression

## 2021-07-07 NOTE — PLAN
[FreeTextEntry1] : #DM2\par Metformin 500mg\par Optho referral given to patient \par Microalbumin/Cr\par Lipid Panel\par Discussed risks and benefits of statin\par Pneumonia Vaccine\par TDAP vaccine \par Healthy living referral given to patient \par \par #HTN\par patient was taking 15 mg of norvasc. DIscussed with patient that this dose was incorrect and too high. \par Patient to continue taking norvasc 10mg\par Return to clinic in 2 weeks for BP check with correct dose of norvasc \par \par Patient to f/u with Urology \par \par FIT kIt given today \par \par Discussed with Dr. Xiao

## 2021-07-09 LAB
CHOLEST SERPL-MCNC: 200 MG/DL
HDLC SERPL-MCNC: 48 MG/DL
LDLC SERPL CALC-MCNC: 82 MG/DL
NONHDLC SERPL-MCNC: 152 MG/DL
TRIGL SERPL-MCNC: 348 MG/DL

## 2021-07-16 DIAGNOSIS — I10 ESSENTIAL (PRIMARY) HYPERTENSION: ICD-10-CM

## 2021-07-16 DIAGNOSIS — E11.9 TYPE 2 DIABETES MELLITUS WITHOUT COMPLICATIONS: ICD-10-CM

## 2021-07-20 LAB — HEMOCCULT STL QL IA: NEGATIVE

## 2021-07-22 ENCOUNTER — APPOINTMENT (OUTPATIENT)
Dept: FAMILY MEDICINE | Facility: HOSPITAL | Age: 61
End: 2021-07-22

## 2021-07-22 ENCOUNTER — OUTPATIENT (OUTPATIENT)
Dept: OUTPATIENT SERVICES | Facility: HOSPITAL | Age: 61
LOS: 1 days | End: 2021-07-22
Payer: COMMERCIAL

## 2021-07-22 DIAGNOSIS — Z00.00 ENCOUNTER FOR GENERAL ADULT MEDICAL EXAMINATION WITHOUT ABNORMAL FINDINGS: ICD-10-CM

## 2021-07-22 DIAGNOSIS — Z23 ENCOUNTER FOR IMMUNIZATION: ICD-10-CM

## 2021-07-22 PROCEDURE — G0463: CPT

## 2021-07-22 NOTE — PHYSICAL EXAM
[No Acute Distress] : no acute distress [Normal Sclera/Conjunctiva] : normal sclera/conjunctiva [Normal Outer Ear/Nose] : the outer ears and nose were normal in appearance [No JVD] : no jugular venous distention [No Respiratory Distress] : no respiratory distress  [No Accessory Muscle Use] : no accessory muscle use [Normal Rate] : normal rate  [Regular Rhythm] : with a regular rhythm [Normal S1, S2] : normal S1 and S2 [Soft] : abdomen soft [Non Tender] : non-tender [No HSM] : no HSM [Normal Bowel Sounds] : normal bowel sounds [No CVA Tenderness] : no CVA  tenderness [No Spinal Tenderness] : no spinal tenderness [Coordination Grossly Intact] : coordination grossly intact [No Focal Deficits] : no focal deficits [Normal Affect] : the affect was normal [Alert and Oriented x3] : oriented to person, place, and time [Normal Insight/Judgement] : insight and judgment were intact

## 2021-07-27 NOTE — REVIEW OF SYSTEMS
[Fever] : no fever [Discharge] : no discharge [Earache] : no earache [Chest Pain] : no chest pain [Shortness Of Breath] : no shortness of breath [Abdominal Pain] : no abdominal pain [Dysuria] : no dysuria [Incontinence] : no incontinence [Joint Pain] : no joint pain [Joint Stiffness] : no joint stiffness [Itching] : no itching [Mole Changes] : no mole changes [Headache] : no headache [Dizziness] : no dizziness [Anxiety] : no anxiety [Depression] : no depression

## 2021-07-27 NOTE — PLAN
[FreeTextEntry1] : #HLD\par Will start Statin today\par \par #HTN\par Losartan HCTZ\par Amlodipine 10mg\par \par #DM\par Metformin 500mg\par Patient advised to not eat high levels of sugar\par Diabetes Education Referral given to patient \par \par #Urology/Enlarged Prostate\par Patient to have MIS with Urology\par Patient to have pre-surgical testing\par Patient to have medical clearance after pre-surgical testing \par \par Discussed with Dr. Xiao

## 2021-07-27 NOTE — HISTORY OF PRESENT ILLNESS
[Diabetes Mellitus] : Diabetes Mellitus [Hypertension] : Hypertension [Hyperlipidemia] : Hyperlipidemia [Patient was last seen on ___] : Patient was last seen on [unfilled] [# of episodes ___] : Reports [unfilled] hypoglycemic episodes since the last visit. [Most Recent A1C: ___] : Most recent A1C was [unfilled] [No therapy] : Patient is not on statin therapy [<130/90] : Target blood pressure is  <130/90 [Near target goal] : BP near target goal [Doing Poorly] : Patient is doing poorly [Moderate Intensity Therapy] : Patient is currently on moderate intensity statin  therapy [Weight Gain ___ Pounds] : Weight gain of [unfilled] pounds [Contemplation] : Contemplation: patient is considering to lose weight within the next 6 months, patient did not attempt to lose weight in the last year. [FreeTextEntry6] : 62 y/o M with pmhx of recent diabetes on metformin, and HTN. Patient was recently started on metformin. Patient says last week he had a significant amount of candy, and noticed he was hypoglycemic, with symptoms of dizziness, palpitations, sweating, at 2am that night. Patient since then has had no hypoglycemic symptoms.  [de-identified] : will start therapy

## 2021-07-30 ENCOUNTER — APPOINTMENT (OUTPATIENT)
Dept: FAMILY MEDICINE | Facility: HOSPITAL | Age: 61
End: 2021-07-30

## 2021-08-05 ENCOUNTER — APPOINTMENT (OUTPATIENT)
Dept: FAMILY MEDICINE | Facility: HOSPITAL | Age: 61
End: 2021-08-05
Payer: COMMERCIAL

## 2021-08-05 ENCOUNTER — OUTPATIENT (OUTPATIENT)
Dept: OUTPATIENT SERVICES | Facility: HOSPITAL | Age: 61
LOS: 1 days | End: 2021-08-05
Payer: COMMERCIAL

## 2021-08-05 VITALS
OXYGEN SATURATION: 95 % | BODY MASS INDEX: 37.25 KG/M2 | WEIGHT: 245 LBS | SYSTOLIC BLOOD PRESSURE: 117 MMHG | HEART RATE: 95 BPM | TEMPERATURE: 97 F | RESPIRATION RATE: 18 BRPM | DIASTOLIC BLOOD PRESSURE: 77 MMHG

## 2021-08-05 DIAGNOSIS — R39.198 OTHER DIFFICULTIES WITH MICTURITION: ICD-10-CM

## 2021-08-05 DIAGNOSIS — Z00.00 ENCOUNTER FOR GENERAL ADULT MEDICAL EXAMINATION WITHOUT ABNORMAL FINDINGS: ICD-10-CM

## 2021-08-05 PROCEDURE — 93005 ELECTROCARDIOGRAM TRACING: CPT

## 2021-08-05 PROCEDURE — G0463: CPT

## 2021-08-05 PROCEDURE — 93010 ELECTROCARDIOGRAM REPORT: CPT | Mod: NC

## 2021-08-05 NOTE — PHYSICAL EXAM
[No Acute Distress] : no acute distress [Well Nourished] : well nourished [Normal Sclera/Conjunctiva] : normal sclera/conjunctiva [PERRL] : pupils equal round and reactive to light [EOMI] : extraocular movements intact [Fundoscopic Exam Performed] : fundoscopic ~T exam ~C was performed [Normal Outer Ear/Nose] : the outer ears and nose were normal in appearance [Normal Oropharynx] : the oropharynx was normal [No JVD] : no jugular venous distention [No Respiratory Distress] : no respiratory distress  [No Accessory Muscle Use] : no accessory muscle use [Clear to Auscultation] : lungs were clear to auscultation bilaterally [Normal Rate] : normal rate  [Regular Rhythm] : with a regular rhythm [Normal S1, S2] : normal S1 and S2 [No Palpable Aorta] : no palpable aorta [Soft] : abdomen soft [Non Tender] : non-tender [No HSM] : no HSM [Normal Bowel Sounds] : normal bowel sounds [Normal Anterior Cervical Nodes] : no anterior cervical lymphadenopathy [No CVA Tenderness] : no CVA  tenderness [No Joint Swelling] : no joint swelling [No Rash] : no rash [Coordination Grossly Intact] : coordination grossly intact [No Focal Deficits] : no focal deficits [Normal Gait] : normal gait [Deep Tendon Reflexes (DTR)] : deep tendon reflexes were 2+ and symmetric [Normal Affect] : the affect was normal [Alert and Oriented x3] : oriented to person, place, and time [Normal Insight/Judgement] : insight and judgment were intact

## 2021-08-06 DIAGNOSIS — I10 ESSENTIAL (PRIMARY) HYPERTENSION: ICD-10-CM

## 2021-08-06 DIAGNOSIS — E11.9 TYPE 2 DIABETES MELLITUS WITHOUT COMPLICATIONS: ICD-10-CM

## 2021-08-09 ENCOUNTER — OUTPATIENT (OUTPATIENT)
Dept: OUTPATIENT SERVICES | Facility: HOSPITAL | Age: 61
LOS: 1 days | End: 2021-08-09
Payer: COMMERCIAL

## 2021-08-09 VITALS
RESPIRATION RATE: 18 BRPM | DIASTOLIC BLOOD PRESSURE: 84 MMHG | OXYGEN SATURATION: 98 % | HEART RATE: 91 BPM | TEMPERATURE: 98 F | WEIGHT: 240.3 LBS | SYSTOLIC BLOOD PRESSURE: 138 MMHG | HEIGHT: 68 IN

## 2021-08-09 DIAGNOSIS — I10 ESSENTIAL (PRIMARY) HYPERTENSION: ICD-10-CM

## 2021-08-09 DIAGNOSIS — E78.5 HYPERLIPIDEMIA, UNSPECIFIED: ICD-10-CM

## 2021-08-09 DIAGNOSIS — N43.3 HYDROCELE, UNSPECIFIED: ICD-10-CM

## 2021-08-09 DIAGNOSIS — L91.8 OTHER HYPERTROPHIC DISORDERS OF THE SKIN: ICD-10-CM

## 2021-08-09 DIAGNOSIS — Z01.818 ENCOUNTER FOR OTHER PREPROCEDURAL EXAMINATION: ICD-10-CM

## 2021-08-09 DIAGNOSIS — E11.9 TYPE 2 DIABETES MELLITUS WITHOUT COMPLICATIONS: ICD-10-CM

## 2021-08-09 LAB
ANION GAP SERPL CALC-SCNC: 9 MMOL/L — SIGNIFICANT CHANGE UP (ref 5–17)
BUN SERPL-MCNC: 15 MG/DL — SIGNIFICANT CHANGE UP (ref 7–23)
CALCIUM SERPL-MCNC: 9.3 MG/DL — SIGNIFICANT CHANGE UP (ref 8.4–10.5)
CHLORIDE SERPL-SCNC: 99 MMOL/L — SIGNIFICANT CHANGE UP (ref 96–108)
CO2 SERPL-SCNC: 31 MMOL/L — SIGNIFICANT CHANGE UP (ref 22–31)
CREAT SERPL-MCNC: 1.29 MG/DL — SIGNIFICANT CHANGE UP (ref 0.5–1.3)
GLUCOSE SERPL-MCNC: 161 MG/DL — HIGH (ref 70–99)
HCT VFR BLD CALC: 40.2 % — SIGNIFICANT CHANGE UP (ref 39–50)
HGB BLD-MCNC: 13.6 G/DL — SIGNIFICANT CHANGE UP (ref 13–17)
MCHC RBC-ENTMCNC: 28.6 PG — SIGNIFICANT CHANGE UP (ref 27–34)
MCHC RBC-ENTMCNC: 33.8 GM/DL — SIGNIFICANT CHANGE UP (ref 32–36)
MCV RBC AUTO: 84.6 FL — SIGNIFICANT CHANGE UP (ref 80–100)
NRBC # BLD: 0 /100 WBCS — SIGNIFICANT CHANGE UP (ref 0–0)
PLATELET # BLD AUTO: 287 K/UL — SIGNIFICANT CHANGE UP (ref 150–400)
POTASSIUM SERPL-MCNC: 3.7 MMOL/L — SIGNIFICANT CHANGE UP (ref 3.5–5.3)
POTASSIUM SERPL-SCNC: 3.7 MMOL/L — SIGNIFICANT CHANGE UP (ref 3.5–5.3)
RBC # BLD: 4.75 M/UL — SIGNIFICANT CHANGE UP (ref 4.2–5.8)
RBC # FLD: 12.8 % — SIGNIFICANT CHANGE UP (ref 10.3–14.5)
SODIUM SERPL-SCNC: 139 MMOL/L — SIGNIFICANT CHANGE UP (ref 135–145)
WBC # BLD: 4.96 K/UL — SIGNIFICANT CHANGE UP (ref 3.8–10.5)
WBC # FLD AUTO: 4.96 K/UL — SIGNIFICANT CHANGE UP (ref 3.8–10.5)

## 2021-08-09 PROCEDURE — 80048 BASIC METABOLIC PNL TOTAL CA: CPT

## 2021-08-09 PROCEDURE — G0463: CPT

## 2021-08-09 PROCEDURE — 85027 COMPLETE CBC AUTOMATED: CPT

## 2021-08-09 PROCEDURE — 36415 COLL VENOUS BLD VENIPUNCTURE: CPT

## 2021-08-09 NOTE — PLAN
[FreeTextEntry1] : # Medical Clearance\par \par EKG performed today wnl\par Patient here for medical clearance for left hydrocelectomy and Right Skin Tag removal \par Patient needs recent Cr level in order to complete medical clearance\par Patient to continue current medications\par Patient to receive pre-surgical testing 8/9/21\par RTC after pre-surgical testing to clear patient with recent Cr level\par \par Discussed with Dr. Xiao

## 2021-08-09 NOTE — H&P PST ADULT - NSICDXPROBLEM_GEN_ALL_CORE_FT
PROBLEM DIAGNOSES  Problem: Other hypertrophic disorders of the skin  Assessment and Plan: Pre-op instructions given. Pt verbalized understanding  Chlorhexidine wash instructions given  Pending: Covidpcr results (test to be done 8/14/2021)  M/C in chart - pending lab results to complete clearance     Problem: Hydrocele, unspecified  Assessment and Plan: noted    Problem: Hyperlipidemia  Assessment and Plan: Pt instructed to take meds     Problem: Hypertension  Assessment and Plan: JAYSON precaution - stop bang 6  Pt instructed to take meds    Problem: Type 2 diabetes mellitus  Assessment and Plan: Accu-chek ordered STAT for day of procedure

## 2021-08-09 NOTE — HISTORY OF PRESENT ILLNESS
[Excellent (>10 METs)] : Excellent (>10 METs) [No Adverse Anesthesia Reaction] : no adverse anesthesia reaction in self or family member [Diabetes] : diabetes [(Patient denies any chest pain, claudication, dyspnea on exertion, orthopnea, palpitations or syncope)] : Patient denies any chest pain, claudication, dyspnea on exertion, orthopnea, palpitations or syncope [Aortic Stenosis] : no aortic stenosis [Atrial Fibrillation] : no atrial fibrillation [Coronary Artery Disease] : no coronary artery disease [Recent Myocardial Infarction] : no recent myocardial infarction [Implantable Device/Pacemaker] : no implantable device/pacemaker [Asthma] : no asthma [COPD] : no COPD [Sleep Apnea] : no sleep apnea [Smoker] : not a smoker [Chronic Anticoagulation] : no chronic anticoagulation [Chronic Kidney Disease] : no chronic kidney disease [FreeTextEntry1] : Left Hydroselectomy [FreeTextEntry2] : 8/14/21 [FreeTextEntry3] : Dr. Adorno [FreeTextEntry5] : patient quit smoking 9 years ago.

## 2021-08-09 NOTE — ASSESSMENT
[High Risk Surgery - Intraperitoneal, Intrathoracic or Supringuinal Vascular Procedures] : High Risk Surgery - Intraperitoneal, Intrathoracic or Supringuinal Vascular Procedures - No (0) [Ischemic Heart Disease] : Ischemic Heart Disease - No (0) [Congestive Heart Failure] : Congestive Heart Failure - No (0) [Prior Cerebrovascular Accident or TIA] : Prior Cerebrovascular Accident or TIA - No (0) [Insulin-dependent Diabetic (1 Point)] : Insulin-dependent Diabetic - No (0) [0] : 0 , RCRI Class: I, Risk of Post-Op Cardiac Complications: 3.9%, 95% CI for Risk Estimate: 2.8% - 5.4% [Continue medications as is] : Continue current medications [As per surgery] : as per surgery [Patient Requires Further Testing] : Patient requires further testing [FreeTextEntry4] : ECG performed

## 2021-08-09 NOTE — H&P PST ADULT - HISTORY OF PRESENT ILLNESS
62yo male with medical h/o HTN, HDL, T2DM, c/o Left Hydrocele and Right groin skin tag. Pt presents today for PST for scheduled Left Hydrocelectomy and Excision of Right Groin Skin Tag on 8/17/2021 62yo male with medical h/o HTN, HDL, and T2DM, c/o Left Hydrocele and Right groin skin tag. Pt presents today for PST for scheduled Left Hydrocelectomy and Excision of Right Groin Skin Tag on 8/17/2021

## 2021-08-09 NOTE — REVIEW OF SYSTEMS
[Fever] : no fever [Discharge] : no discharge [Pain] : no pain [Earache] : no earache [Hearing Loss] : no hearing loss [Chest Pain] : no chest pain [Orthopena] : no orthopnea [Shortness Of Breath] : no shortness of breath [Wheezing] : no wheezing [Cough] : no cough [Dyspnea on Exertion] : not dyspnea on exertion [Abdominal Pain] : no abdominal pain [Nausea] : no nausea [Constipation] : no constipation [Dysuria] : no dysuria [Incontinence] : no incontinence [Hesitancy] : no hesitancy [Itching] : no itching [Headache] : no headache [Dizziness] : no dizziness [Fainting] : no fainting [Confusion] : no confusion

## 2021-08-09 NOTE — H&P PST ADULT - NSICDXPASTMEDICALHX_GEN_ALL_CORE_FT
PAST MEDICAL HISTORY:  Hydrocele left    Hyperlipidemia     Hypertension     Skin tag right groin    Type 2 diabetes mellitus

## 2021-08-09 NOTE — H&P PST ADULT - NEGATIVE GENERAL GENITOURINARY SYMPTOMS
no hematuria/no renal colic/no flank pain L/no flank pain R/no bladder infections/no incontinence/no dysuria/no urinary hesitancy/normal urinary frequency

## 2021-08-09 NOTE — H&P PST ADULT - DOES PATIENT HAVE ADVANCE DIRECTIVE
Reason For Visit  SUSI BUCKLEY is an established patient here today for a chief complaint of sinus x 4 days.   :  services not used.   A chaperone is not applicable. She is unaccompanied.        Quality    Adult Wellness CI height documented, discussion of regular exercise, exercising regularly, printed information given for activities, discussion of nutritional quality of diet, patient education given about proper diet, not using alcohol, no tobacco use, does not have feelings of hopelessness (PHQ-2), no Anhedonia (PHQ-2), has not fallen within the last 12 months, unable to walk and surrogate decision maker documented.      History of Present Illness  76-year-old female with multiple comorbidities including osteoarthritis, hypertension, osteoporosis, vertebral compression fractures, chronic recurrent sinusitis and allergic rhinitis. Meds reviewed diagnosis reviewed. She is due for her Prolia injection and we will get that called in for her and administered. She denies any chest pain or shortness of breath. Back pain is chronic. She denies abdominal pain diarrhea constipation. Denies any unexpected joint pain edema or rash. She is having quite a bit of congestion and purulent nasal discharge as well as cough.      Review of Systems    All other systems reviewed and negative.      Allergies  Adhesive Tape Tape  Fentanyl POWD  Penicillins  Valtrex TABS    Current Meds   1. AmLODIPine Besylate 5 MG Oral Tablet; TAKE 1 TABLET DAILY  Requested for:   25Zpe3382; Last Rx:29Aug2017; Status: ACTIVE - Transmit to Pharmacy - Awaiting   Verification Ordered   2. Anucort-HC 25 MG Rectal Suppository;   Therapy: (Recorded:66Oay7102) to Recorded   3. Aspirin 81 MG TABS; 1 every day;   Therapy: 11Aug2011 to  Requested for: 85Ylm1446 Recorded   4. Atorvastatin Calcium 40 MG Oral Tablet; Take one tablet by mouth every night at bedtime;   Therapy: 27Qty2911 to (Evaluate:19Mar2018)  Requested for: 96Chl5973;  Last   Rx:60Fwf3937 Ordered   5. Calcium + D 250-125 MG-UNIT TABS;   Therapy: (Recorded:14Qqu4203) to Recorded   6. Ciprofloxacin HCl - 250 MG Oral Tablet; 1 bid x 10 days;   Therapy: 13Hew4980 to (Last Rx:52Rof8109)  Requested for: 62Nkd2774; Status: ACTIVE -   Transmit to Pharmacy - Awaiting Verification Ordered   7. Ciprofloxacin HCl - 500 MG Oral Tablet; 1 PO BID for 10 days;   Therapy: 07Ubv6062 to (Last Rx:54Ckn6770)  Requested for: 23Nll7453 Ordered   8. Ciprofloxacin HCl - 500 MG Oral Tablet; 1 PO BID for 10 days;   Therapy: 30Nov2017 to (Last Rx:30Nov2017)  Requested for: 30Nov2017; Status:   ACTIVE - Transmit to Pharmacy - Awaiting Verification Ordered   9. Ciprofloxacin HCl - 500 MG Oral Tablet; TAKE 1 TABLET EVERY 12 HOURS DAILY;   Therapy: 80Wqc3656 to (Evaluate:52Yrd0368)  Requested for: 65Aop5152; Last   Rx:42Vgs1622; Status: ACTIVE - Transmit to Pharmacy - Awaiting Verification Ordered   10. Claritin 10 MG Oral Tablet; TAKE 1 TABLET DAILY;    Therapy: 95Twx4720 to (Evaluate:86Jhi0625); Last Rx:46Nxm8223 Ordered   11. Clopidogrel Bisulfate 75 MG Oral Tablet; take one tablet by mouth every day;    Therapy: 53Kte2564 to (Evaluate:00Tob5351)  Requested for: 95Rsc6456; Last    Rx:61Jel9538; Status: ACTIVE - Transmit to Pharmacy - Awaiting Verification Ordered   12. FiberChoice 2 GM Oral Tablet Chewable;    Therapy: (Recorded:67Xnz8066) to Recorded   13. Fluticasone Propionate 50 MCG/ACT Nasal Suspension; USE 1 SPRAY IN EACH    NOSTRIL TWICE DAILY;    Therapy: 00Rep0959 to (Last Rx:32Xks6484)  Requested for: 72Qlm3290 Ordered   14. Levothyroxine Sodium 50 MCG Oral Tablet; take one tablet by mouth every day;    Therapy: 17Btn4634 to (Evaluate:89Ffa7314)  Requested for: 17Nov2017; Last    Rx:39Fdj9887; Status: ACTIVE - Transmit to Pharmacy - Awaiting Verification Ordered   15. Lisinopril 10 MG Oral Tablet; take one tablet by mouth one time daily;    Therapy: 60Yrz2417 to (Evaluate:18Feb2018)  Requested for:  20Nov2017; Last    Rx:20Nov2017; Status: ACTIVE - Transmit to Pharmacy - Awaiting Verification Ordered   16. Magnesium Oxide 400 (240 Mg) MG Oral Tablet; take one every morning  Requested for:    52Jke9078; Last Rx:86Oyw6852; Status: ACTIVE - Transmit to Pharmacy - Awaiting    Verification Ordered   17. Meloxicam 15 MG Oral Tablet; 1 tab daily;    Therapy: 11Jan2017 to (Last Rx:23Hhh7025)  Requested for: 17Fwc0624; Status: ACTIVE    - Transmit to Pharmacy - Awaiting Verification Ordered   18. Multivitamins Oral Capsule;    Therapy: (Recorded:55Les7538) to Recorded   19. Oseltamivir Phosphate 75 MG Oral Capsule; TAKE 1 CAPSULE TWICE DAILY;    Therapy: 14Feb2017 to (Evaluate:19Feb2017)  Requested for: 10Oru1404; Last    Rx:64Zlj2065 Ordered   20. Oxybutynin Chloride 5 MG Oral Tablet; 1 bid;    Therapy: 61Ebm7203 to (Last Rx:77Gft7117)  Requested for: 75Yna2063; Status: ACTIVE -    Transmit to Pharmacy - Awaiting Verification Ordered   21. Pantoprazole Sodium 40 MG Oral Tablet Delayed Release; TAKE 1 TABLET DAILY; Last    Rx:77Hcm9119 Ordered   22. Piroxicam 20 MG Oral Capsule; TAKE ONE CAPSULE BY MOUTH EVERY DAY AS    NEEDED;    Therapy: 11Feb2015 to (Evaluate:28Jun2018)  Requested for: 38Glf6868; Last    Rx:13Mbz4664 Ordered   23. PredniSONE 10 MG Oral Tablet; 2 tabs tid x2days 2 tabsbid x 3 days,1 tab tid x 2 days,1    tab bidx2 days,1/2 tabtidx2 days,1/2 tabbid x 2 days, 1/2 tabqd x 2 ,1/2 qod 2 dos;    Therapy: 24Niz3965 to (Last Rx:46Rqg8220)  Requested for: 03Yxk8683 Ordered   24. Prolia 60 MG/ML Subcutaneous Solution; INJECT SUBCUTANEOUSLY  60 MG / 1 ML    EVERY 6 MONTHS;    Therapy: (Recorded:05Oxf5335) to Recorded   25. Pyridium 100 MG Oral Tablet; TAKE 1 TABLET 3 TIMES DAILY AFTER MEALS;    Therapy: 17Nov2017 to (Evaluate:24Nov2017)  Requested for: 17Nov2017; Last    Rx:17Nov2017; Status: ACTIVE - Transmit to Pharmacy - Awaiting Verification Ordered   26. Restasis 0.05 % Ophthalmic Emulsion;    Therapy:  (Recorded:65Cfn4560) to Recorded   27. Sucralfate 1 GM Oral Tablet; TAKE 1 TABLET BY MOUTH TWICE DAILY AS NEEDED;    Therapy: (Recorded:13Lud7827) to Recorded   28. Sulfamethoxazole-Trimethoprim 800-160 MG Oral Tablet; TAKE 1 TABLET TWICE DAILY;    Therapy: 17Nov2017 to (Evaluate:24Nov2017)  Requested for: 17Nov2017; Last    Rx:17Nov2017; Status: ACTIVE - Transmit to Pharmacy - Awaiting Verification Ordered   29. Timolol Maleate 0.25 % Ophthalmic Solution;    Therapy: (Recorded:02Nov2016) to Recorded   30. 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)  Dysuria (R30.0)  Fever (R50.9)  Generalized osteoarthritis of multiple sites (M15.9)  Greater trochanteric bursitis, right (M70.61)  Hypercholesterolemia (E78.00)  Hypothyroidism (E03.9)  Impacted cerumen of both ears (H61.23)  Incontinence (R32)  Localized osteoarthritis of hip (M16.9)  Need for immunization against influenza (Z23)  Osteoarthritis (M19.90)  Osteoporosis (M81.0)  Sebaceous cyst (L72.3)  UTI (urinary tract infection) (N39.0)  Visit for screening mammogram (Z12.31)  Vitamin D deficiency (E55.9)  Wart (B07.9)    Past Medical History  History of Encounter for therapeutic drug monitoring (Z51.81)  History of Epidermal inclusion cyst (L72.0)  History of abdominal pain (Z87.898)  History of low back pain (Z87.39)  History of sebaceous cyst (Z87.2)  History of urinary tract infection (Z87.440)  History of vertigo (Z87.898)  Need for immunization against influenza (Z23)  History of Pustule (L08.9)  History of Thrombophlebitis, antepartum, deep (O22.30,I82.409)    Family History  Mother   No pertinent family history    Social History  Denied: History of  Alcohol  Drinks wine (Z78.9)  Exercising Regularly  Never a smoker  Never smoker    Physical Exam  Constitutional: alert, in no acute distress and current vital signs reviewed.   Eyes: no discharge, normal conjunctiva, no eyelid swelling, no ptosis and the sclerae were normal. pupils equal, round and reactive to light and accommodation, conjugate gaze and extraocular movements were intact.   ENT: normal appearing outer ear, normal appearing nose. examination of the tympanic membrane showed normal landmarks, normal appearing external canal . Bilateral effusion is present. There was cerumen impaction bilaterally it is removed with an instrument in the TMs as noted showed bilateral effusion. nasal mucosa moist and pink, no nasal discharge . Sinuses are completely opacified. normal lips. oral mucosa pink and moist, no oral lesions.   Neck: normal appearing neck, supple neck and no mass was seen. thyroid not enlarged and no thyroid nodules.   Pulmonary: no respiratory distress, normal respiratory rate and effort and no accessory muscle use. breath sounds clear to auscultation bilaterally.   Cardiovascular: normal rate, no murmurs were heard, regular rhythm, normal S1 and normal S2. edema was not present in the lower extremities.   Abdomen: soft, nontender, nondistended, normal bowel sounds and no abdominal mass. no hepatomegaly and no splenomegaly. no umbilical hernia was discovered.   Musculoskeletal: normal gait. no musculoskeletal erythema was seen, no joint swelling seen and no joint tenderness was elicited. no scoliosis. normal range of motion. there was no joint instability noted. muscle strength and tone were normal.   Neurologic: cranial nerves grossly intact. normal DTRs. no sensory deficits noted. no coordination deficits. normal gait. muscle strength and tone were normal.      Immunizations  Influenza --- Series1: 04-Nov-2011; Series2: 27-Sep-2012; Series3: 29-Oct-2013; Series4:  08-Oct-2014; Series5:  16-Oct-2015   PPSV --- Series1: 13-Sep-2013     Assessment  Acute sinusitis (J01.90)  Benign essential hypertension (I10)  Compression fracture of lumbar vertebra (S32.000A)  Osteoporosis (M81.0)  Osteoarthritis (M19.90)  Hypothyroidism (E03.9)    Plan  Plans:     Plan:   Prolia injection to be ordered and to be given. Will start on Biaxin.   Patient education completed on disease process, etiology & prognosis.    Patient expresses understanding of the plan.      Signatures   Electronically signed by : Puneet Arenas CMA; Jan 2 2018 10:23AM CST    Electronically signed by : YEN MCNEAL M.D.; Jan 2 2018 10:42AM CST (Author)    Electronically signed by : YEN MCNEAL M.D.; Jan 2 2018 11:02AM CST (Author)     No

## 2021-08-12 ENCOUNTER — APPOINTMENT (OUTPATIENT)
Dept: FAMILY MEDICINE | Facility: HOSPITAL | Age: 61
End: 2021-08-12

## 2021-08-15 DIAGNOSIS — I10 ESSENTIAL (PRIMARY) HYPERTENSION: ICD-10-CM

## 2021-08-15 DIAGNOSIS — E11.9 TYPE 2 DIABETES MELLITUS WITHOUT COMPLICATIONS: ICD-10-CM

## 2021-08-15 DIAGNOSIS — R78.5 FINDING OF OTHER PSYCHOTROPIC DRUG IN BLOOD: ICD-10-CM

## 2021-08-16 ENCOUNTER — TRANSCRIPTION ENCOUNTER (OUTPATIENT)
Age: 61
End: 2021-08-16

## 2021-08-16 ENCOUNTER — APPOINTMENT (OUTPATIENT)
Dept: FAMILY MEDICINE | Facility: HOSPITAL | Age: 61
End: 2021-08-16

## 2021-08-16 ENCOUNTER — OUTPATIENT (OUTPATIENT)
Dept: OUTPATIENT SERVICES | Facility: HOSPITAL | Age: 61
LOS: 1 days | End: 2021-08-16
Payer: COMMERCIAL

## 2021-08-16 VITALS
OXYGEN SATURATION: 97 % | BODY MASS INDEX: 36.83 KG/M2 | RESPIRATION RATE: 18 BRPM | SYSTOLIC BLOOD PRESSURE: 145 MMHG | HEART RATE: 89 BPM | DIASTOLIC BLOOD PRESSURE: 80 MMHG | WEIGHT: 243 LBS | TEMPERATURE: 96.9 F | HEIGHT: 68 IN

## 2021-08-16 DIAGNOSIS — N43.3 HYDROCELE, UNSPECIFIED: ICD-10-CM

## 2021-08-16 DIAGNOSIS — Z00.00 ENCOUNTER FOR GENERAL ADULT MEDICAL EXAMINATION WITHOUT ABNORMAL FINDINGS: ICD-10-CM

## 2021-08-16 PROBLEM — L91.8 OTHER HYPERTROPHIC DISORDERS OF THE SKIN: Chronic | Status: ACTIVE | Noted: 2021-08-09

## 2021-08-16 PROBLEM — E11.9 TYPE 2 DIABETES MELLITUS WITHOUT COMPLICATIONS: Chronic | Status: ACTIVE | Noted: 2021-08-09

## 2021-08-16 PROBLEM — E78.5 HYPERLIPIDEMIA, UNSPECIFIED: Chronic | Status: ACTIVE | Noted: 2021-08-09

## 2021-08-16 PROBLEM — I10 ESSENTIAL (PRIMARY) HYPERTENSION: Chronic | Status: ACTIVE | Noted: 2021-08-09

## 2021-08-16 PROCEDURE — G0463: CPT

## 2021-08-16 NOTE — HISTORY OF PRESENT ILLNESS
[No Pertinent Cardiac History] : no history of aortic stenosis, atrial fibrillation, coronary artery disease, recent myocardial infarction, or implantable device/pacemaker [No Pertinent Pulmonary History] : no history of asthma, COPD, sleep apnea, or smoking [No Adverse Anesthesia Reaction] : no adverse anesthesia reaction in self or family member [Diabetes] : diabetes [(Patient denies any chest pain, claudication, dyspnea on exertion, orthopnea, palpitations or syncope)] : Patient denies any chest pain, claudication, dyspnea on exertion, orthopnea, palpitations or syncope [Excellent (>10 METs)] : Excellent (>10 METs) [Chronic Anticoagulation] : no chronic anticoagulation [Chronic Kidney Disease] : no chronic kidney disease [FreeTextEntry1] : 8/17 [FreeTextEntry2] : 8/17/21 [FreeTextEntry3] : Gabe [FreeTextEntry4] : Left Hydroselectomy on 8/17/21 [FreeTextEntry5] : Decreasing GFR, Please consider if under general anesthesia to hydrate patient, or consider a different anesthetic.   [FreeTextEntry7] : ECG performed Normal Sinus Ryhtm

## 2021-08-16 NOTE — RESULTS/DATA
[] : results reviewed [de-identified] : Cr 1.26, Stable to go to OR  [de-identified] : Normal Sinus Shakiram

## 2021-08-16 NOTE — PHYSICAL EXAM
[No Joint Swelling] : no joint swelling [Grossly Normal Strength/Tone] : grossly normal strength/tone [Normal] : normal gait, coordination grossly intact, no focal deficits and deep tendon reflexes were 2+ and symmetric [Normal Affect] : the affect was normal [Normal Insight/Judgement] : insight and judgment were intact

## 2021-08-16 NOTE — PLAN
[FreeTextEntry1] : 62 YO M presents for Pre-Op Testing\par \par #Pre Op Testing\par - Patient stable for OR\par - RCRI Total Score 0, RCRI Class 1. \par - Continue Medications, except Metformin on AM of Surgery \par - Please Consider different anesthesia due to rising but normal Cr and decreasing GFR\par - Return to Clinic in early September with Dr. FOSTER Morrison

## 2021-08-17 ENCOUNTER — RESULT REVIEW (OUTPATIENT)
Age: 61
End: 2021-08-17

## 2021-08-17 ENCOUNTER — OUTPATIENT (OUTPATIENT)
Dept: INPATIENT UNIT | Facility: HOSPITAL | Age: 61
LOS: 1 days | End: 2021-08-17
Payer: COMMERCIAL

## 2021-08-17 VITALS
SYSTOLIC BLOOD PRESSURE: 119 MMHG | RESPIRATION RATE: 16 BRPM | DIASTOLIC BLOOD PRESSURE: 66 MMHG | TEMPERATURE: 98 F | OXYGEN SATURATION: 97 % | HEART RATE: 70 BPM

## 2021-08-17 VITALS
HEART RATE: 89 BPM | WEIGHT: 240.3 LBS | SYSTOLIC BLOOD PRESSURE: 137 MMHG | RESPIRATION RATE: 14 BRPM | DIASTOLIC BLOOD PRESSURE: 80 MMHG | OXYGEN SATURATION: 98 % | TEMPERATURE: 97 F | HEIGHT: 68 IN

## 2021-08-17 DIAGNOSIS — N43.3 HYDROCELE, UNSPECIFIED: ICD-10-CM

## 2021-08-17 DIAGNOSIS — L91.8 OTHER HYPERTROPHIC DISORDERS OF THE SKIN: ICD-10-CM

## 2021-08-17 LAB — GLUCOSE BLDC GLUCOMTR-MCNC: 126 MG/DL — HIGH (ref 70–99)

## 2021-08-17 PROCEDURE — 82962 GLUCOSE BLOOD TEST: CPT

## 2021-08-17 PROCEDURE — 88302 TISSUE EXAM BY PATHOLOGIST: CPT

## 2021-08-17 PROCEDURE — ZZZZZ: CPT

## 2021-08-17 PROCEDURE — 55040 REMOVAL OF HYDROCELE: CPT

## 2021-08-17 PROCEDURE — 88302 TISSUE EXAM BY PATHOLOGIST: CPT | Mod: 26

## 2021-08-17 RX ORDER — SODIUM CHLORIDE 9 MG/ML
1000 INJECTION, SOLUTION INTRAVENOUS
Refills: 0 | Status: DISCONTINUED | OUTPATIENT
Start: 2021-08-17 | End: 2021-08-17

## 2021-08-17 RX ORDER — HYDROMORPHONE HYDROCHLORIDE 2 MG/ML
0.5 INJECTION INTRAMUSCULAR; INTRAVENOUS; SUBCUTANEOUS
Refills: 0 | Status: DISCONTINUED | OUTPATIENT
Start: 2021-08-17 | End: 2021-08-17

## 2021-08-17 RX ORDER — OXYCODONE HYDROCHLORIDE 5 MG/1
5 TABLET ORAL ONCE
Refills: 0 | Status: DISCONTINUED | OUTPATIENT
Start: 2021-08-17 | End: 2021-08-17

## 2021-08-17 RX ORDER — OXYCODONE HYDROCHLORIDE 5 MG/1
1 TABLET ORAL
Qty: 16 | Refills: 0
Start: 2021-08-17 | End: 2021-08-20

## 2021-08-17 RX ADMIN — SODIUM CHLORIDE 50 MILLILITER(S): 9 INJECTION, SOLUTION INTRAVENOUS at 08:10

## 2021-08-17 NOTE — ASU DISCHARGE PLAN (ADULT/PEDIATRIC) - CARE PROVIDER_API CALL
Josiah Adorno  UROLOGY  [address] [place] [address], [address]  [place], [place] [id number]  Phone: [phone]  Fax: [phone]  Scheduled Appointment: 08/20/2021

## 2021-08-17 NOTE — ASU DISCHARGE PLAN (ADULT/PEDIATRIC) - ASU DC SPECIAL INSTRUCTIONSFT
Wear scrotal support and keep dressings dry and intact.   Sponge bathe only   Ice pack to scrotum for pain and swelling   Take pain medication as prescribed for severe pain , if pain mild take tylenol only  Follow up with Dr Adorno on Friday Aug 20 call office for appt

## 2021-08-17 NOTE — ASU PATIENT PROFILE, ADULT - PATIENT KNOW
----- Message from Nisha Britton MD sent at 6/28/2021  5:28 PM EDT -----  no  ----- Message -----  From: Deandra Crabtree MA  Sent: 6/24/2021  10:50 AM EDT  To: Nisha Britton MD    Called and spoke with HIPAA approved spouse/ person about results. They verified understanding and will further discuss with the patient.    Patient takes a multivitamin but no otc calcium is in her meds that she knows of. Should she change or stop the multi?      
Hub please inform patient if call back:    In regards to the follow up question about pt's calcium and if she needs to stop the multivitamin.   Dr. Britton states no she doesn't need to change or stop her multivitamin. We will recheck at her next lab appointment on 09/16/21    
yes

## 2021-08-18 DIAGNOSIS — E11.9 TYPE 2 DIABETES MELLITUS WITHOUT COMPLICATIONS: ICD-10-CM

## 2021-08-18 DIAGNOSIS — N43.3 HYDROCELE, UNSPECIFIED: ICD-10-CM

## 2021-08-23 LAB — SURGICAL PATHOLOGY STUDY: SIGNIFICANT CHANGE UP

## 2021-09-27 ENCOUNTER — NON-APPOINTMENT (OUTPATIENT)
Age: 61
End: 2021-09-27

## 2021-11-27 ENCOUNTER — RX RENEWAL (OUTPATIENT)
Age: 61
End: 2021-11-27

## 2021-12-22 ENCOUNTER — RX RENEWAL (OUTPATIENT)
Age: 61
End: 2021-12-22

## 2022-01-14 ENCOUNTER — OUTPATIENT (OUTPATIENT)
Dept: OUTPATIENT SERVICES | Facility: HOSPITAL | Age: 62
LOS: 1 days | End: 2022-01-14
Payer: COMMERCIAL

## 2022-01-14 DIAGNOSIS — Z20.828 CONTACT WITH AND (SUSPECTED) EXPOSURE TO OTHER VIRAL COMMUNICABLE DISEASES: ICD-10-CM

## 2022-01-14 LAB — SARS-COV-2 RNA SPEC QL NAA+PROBE: DETECTED

## 2022-01-14 PROCEDURE — U0003: CPT

## 2022-01-14 PROCEDURE — U0005: CPT

## 2022-01-22 ENCOUNTER — EMERGENCY (EMERGENCY)
Facility: HOSPITAL | Age: 62
LOS: 1 days | Discharge: HOME | End: 2022-01-22
Attending: EMERGENCY MEDICINE | Admitting: EMERGENCY MEDICINE
Payer: COMMERCIAL

## 2022-01-22 VITALS
RESPIRATION RATE: 18 BRPM | TEMPERATURE: 98 F | HEART RATE: 102 BPM | DIASTOLIC BLOOD PRESSURE: 79 MMHG | WEIGHT: 235.01 LBS | OXYGEN SATURATION: 99 % | HEIGHT: 68 IN | SYSTOLIC BLOOD PRESSURE: 172 MMHG

## 2022-01-22 PROCEDURE — 99284 EMERGENCY DEPT VISIT MOD MDM: CPT

## 2022-01-22 NOTE — ED ADULT NURSE NOTE - OBJECTIVE STATEMENT
Pt presents to the ED with c/o difficulty breathing that started at 5pm. Denies cp, n/v/d, fever. Recently getting over having covid.

## 2022-01-22 NOTE — ED ADULT NURSE NOTE - NSICDXFAMILYHX_GEN_ALL_CORE_FT
FAMILY HISTORY:  Mother  Still living? Yes, Estimated age: Age Unknown  FHx: type 2 diabetes mellitus, Age at diagnosis: Age Unknown

## 2022-01-23 VITALS
DIASTOLIC BLOOD PRESSURE: 69 MMHG | HEART RATE: 75 BPM | SYSTOLIC BLOOD PRESSURE: 122 MMHG | OXYGEN SATURATION: 100 % | RESPIRATION RATE: 17 BRPM | TEMPERATURE: 98 F

## 2022-01-23 PROCEDURE — 93005 ELECTROCARDIOGRAM TRACING: CPT

## 2022-01-23 PROCEDURE — 93010 ELECTROCARDIOGRAM REPORT: CPT

## 2022-01-23 PROCEDURE — 71045 X-RAY EXAM CHEST 1 VIEW: CPT

## 2022-01-23 PROCEDURE — 70490 CT SOFT TISSUE NECK W/O DYE: CPT | Mod: 26,MA

## 2022-01-23 PROCEDURE — 99284 EMERGENCY DEPT VISIT MOD MDM: CPT | Mod: 25

## 2022-01-23 PROCEDURE — 70490 CT SOFT TISSUE NECK W/O DYE: CPT | Mod: MA

## 2022-01-23 PROCEDURE — 71045 X-RAY EXAM CHEST 1 VIEW: CPT | Mod: 26

## 2022-01-23 NOTE — ED PROVIDER NOTE - OBJECTIVE STATEMENT
61M +COVID as of 7 days ago, presents to the ED reporting that he had flu like illness due to COVID and is improving however today, felt like there was a 'restriction' of his breathing. he has known history of Goiter. He states the mass of his goiter is pressing on his airway. He came for assessment. No cough. No fever. No wheezing or stridor.

## 2022-01-23 NOTE — ED PROVIDER NOTE - PATIENT PORTAL LINK FT
You can access the FollowMyHealth Patient Portal offered by [facility] by registering at the following website: [email or web address]. By joining [facility]’s FollowMyHealth portal, you will also be able to view your health information using other applications (apps) compatible with our system.

## 2022-01-23 NOTE — ED PROVIDER NOTE - CLINICAL SUMMARY MEDICAL DECISION MAKING FREE TEXT BOX
61M +COVID as of 7 days ago, presents to the ED reporting that he had flu like illness due to COVID and is improving however today, felt like there was a 'restriction' of his breathing. he has known history of Goiter. He states the mass of his goiter is pressing on his airway. He came for assessment. No cough. No fever. No wheezing or stridor.  No chest pain. No back pain.   EKG NSR. CXR clear. CT neck performed. pending results.     Reassess: Pt monitored and doing well in ED. Comfortable. CT: 61M +COVID as of 7 days ago, presents to the ED reporting that he had flu like illness due to COVID and is improving however today, felt like there was a 'restriction' of his breathing. he has known history of Goiter. He states the mass of his goiter is pressing on his airway. He came for assessment. No cough. No fever. No wheezing or stridor.  No chest pain. No back pain.   EKG NSR. CXR clear. CT neck performed. pending results.     Reassess: Pt monitored and doing well in ED. Comfortable. CT: goiter noted but no compression of the cervical trachea. Pt stable for d/c.   Worsening, continued or ANY new concerning symptoms return to the emergency department.

## 2022-01-23 NOTE — ED PROVIDER NOTE - NSFOLLOWUPINSTRUCTIONS_ED_ALL_ED_FT
Shortness of breath    Shortness of breath (dyspnea) means you have trouble breathing and could indicate a medical problem. Causes include lung disease, heart disease, low amount of red blood cells (anemia), poor physical fitness, being overweight, smoking, etc. Your health care provider today may not be able to find a cause for your shortness of breath after your exam. In this case, it is important to have a follow-up exam with your primary care physician as instructed. If medicines were prescribed, take them as directed for the full length of time directed. Refrain from tobacco products.    For your goiter, please follow with your doctors at Family Medicine clinic.     SEEK IMMEDIATE MEDICAL CARE IF YOU HAVE ANY OF THE FOLLOWING SYMPTOMS: worsening shortness of breath, chest pain, back pain, abdominal pain, fever, coughing up blood, lightheadedness/dizziness. Shortness of breath    Shortness of breath (dyspnea) means you have trouble breathing and could indicate a medical problem. Causes include lung disease, heart disease, low amount of red blood cells (anemia), poor physical fitness, being overweight, smoking, etc. Your health care provider today may not be able to find a cause for your shortness of breath after your exam. In this case, it is important to have a follow-up exam with your primary care physician as instructed. If medicines were prescribed, take them as directed for the full length of time directed. Refrain from tobacco products.    For your goiter, please follow with your doctors at Family Medicine clinic. There is no impingement on the airway. Lungs clear on xray.     SEEK IMMEDIATE MEDICAL CARE IF YOU HAVE ANY OF THE FOLLOWING SYMPTOMS: worsening shortness of breath, chest pain, back pain, abdominal pain, fever, coughing up blood, lightheadedness/dizziness.

## 2022-01-23 NOTE — ED PROVIDER NOTE - NSFOLLOWUPCLINICS_GEN_ALL_ED_FT
[facility]  Family Medicine  [address] [place] [id number]  Phone: [phone]  Fax:

## 2022-01-24 ENCOUNTER — NON-APPOINTMENT (OUTPATIENT)
Age: 62
End: 2022-01-24

## 2022-02-23 ENCOUNTER — RX RENEWAL (OUTPATIENT)
Age: 62
End: 2022-02-23

## 2022-02-26 ENCOUNTER — NON-APPOINTMENT (OUTPATIENT)
Age: 62
End: 2022-02-26

## 2022-02-26 ENCOUNTER — APPOINTMENT (OUTPATIENT)
Dept: FAMILY MEDICINE | Facility: HOSPITAL | Age: 62
End: 2022-02-26

## 2022-02-26 ENCOUNTER — RESULT CHARGE (OUTPATIENT)
Age: 62
End: 2022-02-26

## 2022-02-26 ENCOUNTER — OUTPATIENT (OUTPATIENT)
Dept: OUTPATIENT SERVICES | Facility: HOSPITAL | Age: 62
LOS: 1 days | End: 2022-02-26
Payer: COMMERCIAL

## 2022-02-26 VITALS
BODY MASS INDEX: 36.49 KG/M2 | DIASTOLIC BLOOD PRESSURE: 77 MMHG | TEMPERATURE: 97.2 F | HEART RATE: 92 BPM | WEIGHT: 240 LBS | RESPIRATION RATE: 17 BRPM | OXYGEN SATURATION: 100 % | SYSTOLIC BLOOD PRESSURE: 138 MMHG

## 2022-02-26 DIAGNOSIS — R73.03 PREDIABETES.: ICD-10-CM

## 2022-02-26 DIAGNOSIS — N50.89 OTHER SPECIFIED DISORDERS OF THE MALE GENITAL ORGANS: ICD-10-CM

## 2022-02-26 DIAGNOSIS — Z00.00 ENCOUNTER FOR GENERAL ADULT MEDICAL EXAMINATION WITHOUT ABNORMAL FINDINGS: ICD-10-CM

## 2022-02-26 LAB — HBA1C MFR BLD HPLC: 6.2

## 2022-02-26 PROCEDURE — G0463: CPT

## 2022-02-26 PROCEDURE — 82043 UR ALBUMIN QUANTITATIVE: CPT

## 2022-02-26 RX ORDER — AMLODIPINE BESYLATE 5 MG/1
5 TABLET ORAL
Qty: 30 | Refills: 5 | Status: DISCONTINUED | COMMUNITY
Start: 2021-11-27 | End: 2022-02-26

## 2022-02-28 ENCOUNTER — NON-APPOINTMENT (OUTPATIENT)
Age: 62
End: 2022-02-28

## 2022-02-28 DIAGNOSIS — I10 ESSENTIAL (PRIMARY) HYPERTENSION: ICD-10-CM

## 2022-02-28 DIAGNOSIS — E11.9 TYPE 2 DIABETES MELLITUS WITHOUT COMPLICATIONS: ICD-10-CM

## 2022-02-28 DIAGNOSIS — N50.89 OTHER SPECIFIED DISORDERS OF THE MALE GENITAL ORGANS: ICD-10-CM

## 2022-02-28 LAB
CREAT SPEC-SCNC: 233 MG/DL
MICROALBUMIN 24H UR DL<=1MG/L-MCNC: 1.4 MG/DL
MICROALBUMIN/CREAT 24H UR-RTO: 6 MG/G

## 2022-02-28 NOTE — HISTORY OF PRESENT ILLNESS
[Diabetes Mellitus] : Diabetes Mellitus [Hypertension] : Hypertension [Obesity] : Obesity [Patient was last seen on ___] : Patient was last seen on [unfilled] [No episodes] : No hypoglycemic episodes since the last visit. [Does not check] : Patient does not check blood glucose regularly [Most Recent A1C: ___] : Most recent A1C was [unfilled] [Moderate Intensity] : Patient is currently on moderate intensity statin  therapy [Does not check BP] : The patient is not checking blood pressure [<140/90] : Target blood pressure is <140/90 [Target goal met] : BP target goal met [Managed with medications] : managed with  medication [Moderate Intensity Therapy] : Patient is currently on moderate intensity statin  therapy [Weight Loss ___ Pounds] : Weight loss of [unfilled] pounds [___ # of attempts] : [unfilled] weight lost attempts [___ times per week] : Patient exercises [unfilled] times per week [Following  treatment as advised] : Patient is following  treatment as advised [Action] : Action: patient is following a plan to lose weight [FreeTextEntry6] : Patient says he has been doing much better. His testicular swelling has gone down since the surgery, and has no pain or issues with his testes now. Patient says he is exercising 5-10 times a week with walking. Patient is being more conscious about his diet, and choosing better options. He has been working on gaining his strength back after having COVID.  [de-identified] : Patient does have BP cuff at home  [FreeTextEntry1] : #Sexual Health\par Patient using other methods of intimacy\par Has issues with erections, but realized it was mental and is doing better now.

## 2022-02-28 NOTE — PLAN
[FreeTextEntry1] : #DM\par A1C 6.2\par Albumin/Cr ratio\par Continue statin\par Continue losartan\par Continue Metformin\par Diabetes [facility]\par Diabetes Clinic to discuss diet at length with educator \par \par #Thyroid Nodule\par Referral to ENT\par Thyroid U/S\par \par #HLD\par Continue Statin\par \par #Obesity\par Patient encouraged to examine diet, heart healthy diet reviewed with lower calories, less carbohydrates\par \par #HTN\par BP at goal\par <140/90\par Continue Losartan and HCTZ\par Amlodipine 10mg\par \par RTC in 3 weeks to f/u weight loss and talk about a healthy diet with diabetes\par \par CPE May 2022\par \par Discussed with Dr. Steward

## 2022-02-28 NOTE — REVIEW OF SYSTEMS
[Fever] : no fever [Discharge] : no discharge [Earache] : no earache [Chest Pain] : no chest pain [Shortness Of Breath] : no shortness of breath [Wheezing] : no wheezing [Abdominal Pain] : no abdominal pain [Nausea] : no nausea [Vomiting] : no vomiting [Joint Pain] : no joint pain [Itching] : no itching [Mole Changes] : no mole changes [Anxiety] : no anxiety [Depression] : no depression

## 2022-02-28 NOTE — PHYSICAL EXAM
[No Acute Distress] : no acute distress [No Respiratory Distress] : no respiratory distress  [No Accessory Muscle Use] : no accessory muscle use [Normal Rate] : normal rate  [Regular Rhythm] : with a regular rhythm [Comprehensive Foot Exam Normal] : Right and left foot were examined and both feet are normal. No ulcers in either foot. Toes are normal and with full ROM.  Normal tactile sensation with monofilament testing throughout both feet [de-identified] :  exam deffered

## 2022-03-08 NOTE — H&P PST ADULT - BMI (KG/M2)
Instruct patient to call for assistance/Room bathroom lighting operational/Non-slip footwear when patient is off stretcher/Physically safe environment: no spills, clutter or unnecessary equipment/Stretcher in lowest position, wheels locked, appropriate side rails in place/Provide visual cue, wrist band, yellow gown, etc./Monitor gait and stability/Monitor for mental status changes and reorient to person, place, and time/Review medications for side effects contributing to fall risk/Reinforce activity limits and safety measures with patient and family/Provide visual clues: red socks 36.5

## 2022-07-06 ENCOUNTER — RX RENEWAL (OUTPATIENT)
Age: 62
End: 2022-07-06

## 2022-07-12 ENCOUNTER — RX RENEWAL (OUTPATIENT)
Age: 62
End: 2022-07-12

## 2022-12-13 ENCOUNTER — RX RENEWAL (OUTPATIENT)
Age: 62
End: 2022-12-13

## 2023-01-03 ENCOUNTER — APPOINTMENT (OUTPATIENT)
Dept: FAMILY MEDICINE | Facility: HOSPITAL | Age: 63
End: 2023-01-03

## 2023-01-06 ENCOUNTER — NON-APPOINTMENT (OUTPATIENT)
Age: 63
End: 2023-01-06

## 2023-01-28 ENCOUNTER — RX RENEWAL (OUTPATIENT)
Age: 63
End: 2023-01-28

## 2023-06-23 ENCOUNTER — RESULT CHARGE (OUTPATIENT)
Age: 63
End: 2023-06-23

## 2023-06-23 ENCOUNTER — OUTPATIENT (OUTPATIENT)
Dept: OUTPATIENT SERVICES | Facility: HOSPITAL | Age: 63
LOS: 1 days | End: 2023-06-23
Payer: COMMERCIAL

## 2023-06-23 ENCOUNTER — APPOINTMENT (OUTPATIENT)
Dept: FAMILY MEDICINE | Facility: HOSPITAL | Age: 63
End: 2023-06-23
Payer: COMMERCIAL

## 2023-06-23 VITALS
OXYGEN SATURATION: 98 % | RESPIRATION RATE: 17 BRPM | DIASTOLIC BLOOD PRESSURE: 95 MMHG | BODY MASS INDEX: 36.49 KG/M2 | HEART RATE: 91 BPM | TEMPERATURE: 97.6 F | SYSTOLIC BLOOD PRESSURE: 191 MMHG | WEIGHT: 240 LBS

## 2023-06-23 VITALS — SYSTOLIC BLOOD PRESSURE: 185 MMHG | DIASTOLIC BLOOD PRESSURE: 83 MMHG

## 2023-06-23 DIAGNOSIS — Z00.00 ENCOUNTER FOR GENERAL ADULT MEDICAL EXAMINATION WITHOUT ABNORMAL FINDINGS: ICD-10-CM

## 2023-06-23 DIAGNOSIS — F63.0 PATHOLOGICAL GAMBLING: ICD-10-CM

## 2023-06-23 LAB — HBA1C MFR BLD HPLC: 6.8

## 2023-06-23 PROCEDURE — 80053 COMPREHEN METABOLIC PANEL: CPT

## 2023-06-23 PROCEDURE — 93010 ELECTROCARDIOGRAM REPORT: CPT

## 2023-06-23 PROCEDURE — 82043 UR ALBUMIN QUANTITATIVE: CPT

## 2023-06-23 PROCEDURE — 93005 ELECTROCARDIOGRAM TRACING: CPT

## 2023-06-23 PROCEDURE — 80061 LIPID PANEL: CPT

## 2023-06-23 PROCEDURE — 85025 COMPLETE CBC W/AUTO DIFF WBC: CPT

## 2023-06-23 PROCEDURE — G0463: CPT

## 2023-06-23 RX ORDER — METFORMIN HYDROCHLORIDE 500 MG/1
500 TABLET, COATED ORAL
Qty: 30 | Refills: 5 | Status: DISCONTINUED | COMMUNITY
Start: 2021-06-28 | End: 2023-06-23

## 2023-06-23 RX ORDER — ATORVASTATIN CALCIUM 40 MG/1
40 TABLET, FILM COATED ORAL
Qty: 1 | Refills: 3 | Status: ACTIVE | COMMUNITY
Start: 2021-07-22 | End: 1900-01-01

## 2023-06-23 RX ORDER — LOSARTAN POTASSIUM 50 MG/1
50 TABLET, FILM COATED ORAL
Qty: 90 | Refills: 3 | Status: ACTIVE | COMMUNITY
Start: 2022-02-24 | End: 1900-01-01

## 2023-06-23 NOTE — PHYSICAL EXAM
[No Acute Distress] : no acute distress [No JVD] : no jugular venous distention [No Respiratory Distress] : no respiratory distress  [Normal Rate] : normal rate  [Regular Rhythm] : with a regular rhythm [Normal S1, S2] : normal S1 and S2 [No CVA Tenderness] : no CVA  tenderness [Normal Affect] : the affect was normal [Alert and Oriented x3] : oriented to person, place, and time [Normal Insight/Judgement] : insight and judgment were intact

## 2023-06-26 DIAGNOSIS — E11.9 TYPE 2 DIABETES MELLITUS WITHOUT COMPLICATIONS: ICD-10-CM

## 2023-06-26 DIAGNOSIS — I16.0 HYPERTENSIVE URGENCY: ICD-10-CM

## 2023-06-26 DIAGNOSIS — R07.9 CHEST PAIN, UNSPECIFIED: ICD-10-CM

## 2023-06-26 DIAGNOSIS — I10 ESSENTIAL (PRIMARY) HYPERTENSION: ICD-10-CM

## 2023-06-26 DIAGNOSIS — F63.0 PATHOLOGICAL GAMBLING: ICD-10-CM

## 2023-06-27 LAB
ALBUMIN SERPL ELPH-MCNC: 4.8 G/DL
ALP BLD-CCNC: 83 U/L
ALT SERPL-CCNC: 18 U/L
ANION GAP SERPL CALC-SCNC: 12 MMOL/L
AST SERPL-CCNC: 18 U/L
BILIRUB SERPL-MCNC: 0.4 MG/DL
BUN SERPL-MCNC: 14 MG/DL
CALCIUM SERPL-MCNC: 9.4 MG/DL
CHLORIDE SERPL-SCNC: 103 MMOL/L
CHOLEST SERPL-MCNC: 190 MG/DL
CO2 SERPL-SCNC: 26 MMOL/L
CREAT SERPL-MCNC: 1 MG/DL
CREAT SPEC-SCNC: 94 MG/DL
EGFR: 84 ML/MIN/1.73M2
GLUCOSE SERPL-MCNC: 107 MG/DL
HDLC SERPL-MCNC: 53 MG/DL
LDLC SERPL CALC-MCNC: 96 MG/DL
MICROALBUMIN 24H UR DL<=1MG/L-MCNC: <1.2 MG/DL
MICROALBUMIN/CREAT 24H UR-RTO: NORMAL MG/G
NONHDLC SERPL-MCNC: 137 MG/DL
POTASSIUM SERPL-SCNC: 4 MMOL/L
PROT SERPL-MCNC: 7.1 G/DL
SODIUM SERPL-SCNC: 140 MMOL/L
TRIGL SERPL-MCNC: 204 MG/DL

## 2023-06-28 NOTE — REVIEW OF SYSTEMS
[Chest Pain] : chest pain [Fever] : no fever [Discharge] : no discharge [Earache] : no earache [Orthopena] : no orthopnea [Shortness Of Breath] : no shortness of breath [Abdominal Pain] : no abdominal pain [Dysuria] : no dysuria [Anxiety] : no anxiety [Depression] : no depression

## 2023-06-28 NOTE — HISTORY OF PRESENT ILLNESS
[Diabetes Mellitus] : Diabetes Mellitus [Hyperlipidemia] : Hyperlipidemia [Hypertension] : Hypertension [Patient was last seen on ___] : Patient was last seen on [unfilled] [No episodes] : No hypoglycemic episodes since the last visit. 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 He was only sitting down. He says it felt muscular and uses his left arm a lot. He was worried about heart attack. He says it was a lighting pain that he felt in his arm.

## 2023-06-28 NOTE — PLAN
[FreeTextEntry1] : #HTN\par Amlodipine\par HCTZ\par Losartan\par \par #Diabetes Mellitus\par A1C 6.8\par Semaglutide \par Losartan renewed\par Education given regarding hyperglycemic and hypoglycemic symptoms\par Patient to continue exercise- she goes to gym, and likes to run\par Patient encouraged to continue diabetic conscious diet\par Referral for Eye Clinic\par atorvastatin renewed\par f/u albumin/cr ratio\par \par #Gambling Addiction\par Addiction to online gambling\par SW referral, therapy referral\par Patient wants to go AA or group therapy sessions\par \par #Chest Pain\par EKG wnl\par Echo ordered- concern for htn changes \par Patient given ER precautions if he develops chest pain to be evaluated in the ED, dyspnea, headaches, vision changes, acute weakness. Reviewed elevated risk of stroke or heart attack in patient. 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 concern for MSK origin. continue to monitor.   \par \par RTC in one week for HTN f/u \par \par Discussed with Dr. Bazzi\par

## 2023-06-29 ENCOUNTER — APPOINTMENT (OUTPATIENT)
Dept: FAMILY MEDICINE | Facility: HOSPITAL | Age: 63
End: 2023-06-29

## 2023-06-29 ENCOUNTER — OUTPATIENT (OUTPATIENT)
Dept: OUTPATIENT SERVICES | Facility: HOSPITAL | Age: 63
LOS: 1 days | End: 2023-06-29
Payer: COMMERCIAL

## 2023-06-29 VITALS
TEMPERATURE: 98.6 F | RESPIRATION RATE: 17 BRPM | HEART RATE: 100 BPM | BODY MASS INDEX: 36.49 KG/M2 | SYSTOLIC BLOOD PRESSURE: 128 MMHG | DIASTOLIC BLOOD PRESSURE: 86 MMHG | OXYGEN SATURATION: 98 % | WEIGHT: 240 LBS

## 2023-06-29 DIAGNOSIS — Z00.00 ENCOUNTER FOR GENERAL ADULT MEDICAL EXAMINATION WITHOUT ABNORMAL FINDINGS: ICD-10-CM

## 2023-06-29 DIAGNOSIS — R00.0 TACHYCARDIA, UNSPECIFIED: ICD-10-CM

## 2023-06-29 DIAGNOSIS — I10 ESSENTIAL (PRIMARY) HYPERTENSION: ICD-10-CM

## 2023-06-29 PROCEDURE — G0463: CPT

## 2023-07-03 PROBLEM — R00.0 TACHYCARDIA: Status: ACTIVE | Noted: 2023-06-29

## 2023-07-03 NOTE — PHYSICAL EXAM
[No Varicosities] : no varicosities [Pedal Pulses Present] : the pedal pulses are present [No Edema] : there was no peripheral edema [Normal] : no joint swelling and grossly normal strength and tone [Regular Rhythm] : with a regular rhythm [Normal S1, S2] : normal S1 and S2 [No Murmur] : no murmur heard [de-identified] : + Tachycardia

## 2023-07-03 NOTE — HISTORY OF PRESENT ILLNESS
[FreeTextEntry1] : HTN [de-identified] : 63 year old male with PMH of diabetes, HTN and HLD presents for blood pressure check. Pt was recently restarted back on Amlodipine 10mg, Atorvostatin 40mg, Losartan 50mg and HCTZ 12.5mg. Pt reports that he was unable to take his medications prior he could not afford the medications at the time and he also could not make appt with the doctor due to time. Pt reports as of last week he has been compliant with his medications.\par \par Also pt states he has not been able to start the ozempic that was ordered for him because he was told he had to pay $380. Pt reports he is not able to that much for the medication. Pt requests for renewal of his metformin.

## 2023-09-06 ENCOUNTER — EMERGENCY (EMERGENCY)
Facility: HOSPITAL | Age: 63
LOS: 1 days | Discharge: HOME | End: 2023-09-06
Attending: EMERGENCY MEDICINE | Admitting: EMERGENCY MEDICINE
Payer: COMMERCIAL

## 2023-09-06 VITALS
RESPIRATION RATE: 18 BRPM | DIASTOLIC BLOOD PRESSURE: 81 MMHG | SYSTOLIC BLOOD PRESSURE: 136 MMHG | HEART RATE: 81 BPM | OXYGEN SATURATION: 98 %

## 2023-09-06 VITALS
HEIGHT: 68 IN | DIASTOLIC BLOOD PRESSURE: 94 MMHG | HEART RATE: 119 BPM | SYSTOLIC BLOOD PRESSURE: 163 MMHG | TEMPERATURE: 98 F | WEIGHT: 235.01 LBS | RESPIRATION RATE: 18 BRPM | OXYGEN SATURATION: 97 %

## 2023-09-06 LAB
ALBUMIN SERPL ELPH-MCNC: 3.7 G/DL — SIGNIFICANT CHANGE UP (ref 3.3–5)
ALP SERPL-CCNC: 89 U/L — SIGNIFICANT CHANGE UP (ref 40–120)
ALT FLD-CCNC: 19 U/L — SIGNIFICANT CHANGE UP (ref 10–45)
ANION GAP SERPL CALC-SCNC: 4 MMOL/L — LOW (ref 5–17)
AST SERPL-CCNC: 10 U/L — SIGNIFICANT CHANGE UP (ref 10–40)
BASOPHILS # BLD AUTO: 0.04 K/UL — SIGNIFICANT CHANGE UP (ref 0–0.2)
BASOPHILS NFR BLD AUTO: 0.7 % — SIGNIFICANT CHANGE UP (ref 0–2)
BILIRUB SERPL-MCNC: 0.4 MG/DL — SIGNIFICANT CHANGE UP (ref 0.2–1.2)
BUN SERPL-MCNC: 17 MG/DL — SIGNIFICANT CHANGE UP (ref 7–23)
CALCIUM SERPL-MCNC: 9.2 MG/DL — SIGNIFICANT CHANGE UP (ref 8.4–10.5)
CHLORIDE SERPL-SCNC: 102 MMOL/L — SIGNIFICANT CHANGE UP (ref 96–108)
CO2 SERPL-SCNC: 32 MMOL/L — HIGH (ref 22–31)
CREAT SERPL-MCNC: 1.2 MG/DL — SIGNIFICANT CHANGE UP (ref 0.5–1.3)
D DIMER BLD IA.RAPID-MCNC: <150 NG/ML DDU — SIGNIFICANT CHANGE UP
EGFR: 67 ML/MIN/1.73M2 — SIGNIFICANT CHANGE UP
EOSINOPHIL # BLD AUTO: 0.21 K/UL — SIGNIFICANT CHANGE UP (ref 0–0.5)
EOSINOPHIL NFR BLD AUTO: 3.8 % — SIGNIFICANT CHANGE UP (ref 0–6)
GLUCOSE SERPL-MCNC: 179 MG/DL — HIGH (ref 70–99)
HCT VFR BLD CALC: 34.5 % — LOW (ref 39–50)
HGB BLD-MCNC: 11.4 G/DL — LOW (ref 13–17)
IMM GRANULOCYTES NFR BLD AUTO: 0.4 % — SIGNIFICANT CHANGE UP (ref 0–0.9)
LIDOCAIN IGE QN: 55 U/L — SIGNIFICANT CHANGE UP (ref 16–77)
LYMPHOCYTES # BLD AUTO: 2.39 K/UL — SIGNIFICANT CHANGE UP (ref 1–3.3)
LYMPHOCYTES # BLD AUTO: 42.8 % — SIGNIFICANT CHANGE UP (ref 13–44)
MCHC RBC-ENTMCNC: 28.4 PG — SIGNIFICANT CHANGE UP (ref 27–34)
MCHC RBC-ENTMCNC: 33 GM/DL — SIGNIFICANT CHANGE UP (ref 32–36)
MCV RBC AUTO: 85.8 FL — SIGNIFICANT CHANGE UP (ref 80–100)
MONOCYTES # BLD AUTO: 0.6 K/UL — SIGNIFICANT CHANGE UP (ref 0–0.9)
MONOCYTES NFR BLD AUTO: 10.8 % — SIGNIFICANT CHANGE UP (ref 2–14)
NEUTROPHILS # BLD AUTO: 2.32 K/UL — SIGNIFICANT CHANGE UP (ref 1.8–7.4)
NEUTROPHILS NFR BLD AUTO: 41.5 % — LOW (ref 43–77)
NRBC # BLD: 0 /100 WBCS — SIGNIFICANT CHANGE UP (ref 0–0)
PLATELET # BLD AUTO: 286 K/UL — SIGNIFICANT CHANGE UP (ref 150–400)
POTASSIUM SERPL-MCNC: 3.5 MMOL/L — SIGNIFICANT CHANGE UP (ref 3.5–5.3)
POTASSIUM SERPL-SCNC: 3.5 MMOL/L — SIGNIFICANT CHANGE UP (ref 3.5–5.3)
PROT SERPL-MCNC: 7.3 G/DL — SIGNIFICANT CHANGE UP (ref 6–8.3)
RBC # BLD: 4.02 M/UL — LOW (ref 4.2–5.8)
RBC # FLD: 12.9 % — SIGNIFICANT CHANGE UP (ref 10.3–14.5)
SODIUM SERPL-SCNC: 138 MMOL/L — SIGNIFICANT CHANGE UP (ref 135–145)
TROPONIN I, HIGH SENSITIVITY RESULT: 4.6 NG/L — SIGNIFICANT CHANGE UP
TROPONIN I, HIGH SENSITIVITY RESULT: 5.1 NG/L — SIGNIFICANT CHANGE UP
WBC # BLD: 5.58 K/UL — SIGNIFICANT CHANGE UP (ref 3.8–10.5)
WBC # FLD AUTO: 5.58 K/UL — SIGNIFICANT CHANGE UP (ref 3.8–10.5)

## 2023-09-06 PROCEDURE — 84484 ASSAY OF TROPONIN QUANT: CPT

## 2023-09-06 PROCEDURE — 85025 COMPLETE CBC W/AUTO DIFF WBC: CPT

## 2023-09-06 PROCEDURE — 71045 X-RAY EXAM CHEST 1 VIEW: CPT | Mod: 26

## 2023-09-06 PROCEDURE — 99285 EMERGENCY DEPT VISIT HI MDM: CPT | Mod: 25

## 2023-09-06 PROCEDURE — 85379 FIBRIN DEGRADATION QUANT: CPT

## 2023-09-06 PROCEDURE — 93005 ELECTROCARDIOGRAM TRACING: CPT

## 2023-09-06 PROCEDURE — 80053 COMPREHEN METABOLIC PANEL: CPT

## 2023-09-06 PROCEDURE — 71045 X-RAY EXAM CHEST 1 VIEW: CPT

## 2023-09-06 PROCEDURE — 93010 ELECTROCARDIOGRAM REPORT: CPT

## 2023-09-06 PROCEDURE — 99285 EMERGENCY DEPT VISIT HI MDM: CPT

## 2023-09-06 PROCEDURE — 83690 ASSAY OF LIPASE: CPT

## 2023-09-06 PROCEDURE — 36415 COLL VENOUS BLD VENIPUNCTURE: CPT

## 2023-09-06 RX ORDER — AMLODIPINE BESYLATE 2.5 MG/1
15 TABLET ORAL
Qty: 0 | Refills: 0 | DISCHARGE

## 2023-09-06 RX ORDER — ATORVASTATIN CALCIUM 80 MG/1
1 TABLET, FILM COATED ORAL
Qty: 0 | Refills: 0 | DISCHARGE

## 2023-09-06 RX ORDER — METFORMIN HYDROCHLORIDE 850 MG/1
1 TABLET ORAL
Qty: 0 | Refills: 0 | DISCHARGE

## 2023-09-06 RX ORDER — LOSARTAN/HYDROCHLOROTHIAZIDE 100MG-25MG
1 TABLET ORAL
Qty: 0 | Refills: 0 | DISCHARGE

## 2023-09-06 NOTE — ED PROVIDER NOTE - PATIENT PORTAL LINK FT
You can access the FollowMyHealth Patient Portal offered by [facility] by registering at the following website: [email or web address]. By joining [facility]’s FollowMyHealth portal, you will also be able to view your health information using other applications (apps) compatible with our system.

## 2023-09-06 NOTE — ED PROVIDER NOTE - OBJECTIVE STATEMENT
63-year-old male with history of hypertension, diabetes, hyperlipidemia, former smoker, complaining of left upper chest pain since 8 PM tonight, mild, with slight SOB.  Worse with left arm movements.  No nausea vomiting dizziness.  No sweats.  No recalled trauma.  No leg pain swelling or recent periods of immobilization.  No fever chills cough.  No illegal drug use

## 2023-09-06 NOTE — ED PROVIDER NOTE - CLINICAL SUMMARY MEDICAL DECISION MAKING FREE TEXT BOX
63-year-old male with history of hypertension, diabetes, hyperlipidemia, former smoker, complaining of left upper chest pain since 8 PM tonight, mild, with slight SOB.  Worse with left arm movements.  No nausea vomiting dizziness.  No sweats.  No recalled trauma.  No leg pain swelling or recent periods of immobilization.  No fever chills cough.  No illegal drug use    Patient well-appearing, vitals unremarkable.  Chest pain atypical, but patient with cardiac risk factors.  Will check troponins, labs, EKG, rule ACS.  Partial DDx: Muscle strain, GERD.    Update: EKG completely normal.  Chest x-ray is normal.  Troponin x2 normal, flat.  Patient pain-free currently.  Recommend close follow-up with PMD and cardiology

## 2023-09-06 NOTE — ED ADULT NURSE NOTE - DRUG PRE-SCREENING (DAST -1)
Problem: Falls - Risk of  Goal: *Absence of Falls  Description: Document Altagracia Marking Fall Risk and appropriate interventions in the flowsheet.   9/15/2022 1755 by Sherryle Christians, RN  Outcome: Progressing Towards Goal  Note: Fall Risk Interventions:            Medication Interventions: Assess postural VS orthostatic hypotension, Evaluate medications/consider consulting pharmacy, Patient to call before getting OOB, Teach patient to arise slowly                9/15/2022 1754 by Sherryle Christians, RN  Outcome: Progressing Towards Goal  Note: Fall Risk Interventions:            Medication Interventions: Assess postural VS orthostatic hypotension, Evaluate medications/consider consulting pharmacy, Patient to call before getting OOB, Teach patient to arise slowly                   Problem: Patient Education: Go to Patient Education Activity  Goal: Patient/Family Education  9/15/2022 1755 by Sherryle Christians, RN  Outcome: Progressing Towards Goal  9/15/2022 1754 by Sherryle Christians, RN  Outcome: Progressing Towards Goal     Problem: Patient Education: Go to Patient Education Activity  Goal: Patient/Family Education  Outcome: Progressing Towards Goal     Problem: Pain  Goal: *Control of Pain  Outcome: Progressing Towards Goal  Goal: *PALLIATIVE CARE:  Alleviation of Pain  Outcome: Progressing Towards Goal     Problem: Patient Education: Go to Patient Education Activity  Goal: Patient/Family Education  Outcome: Progressing Towards Goal     Problem: General Medical Care Plan  Goal: *Vital signs within specified parameters  Outcome: Progressing Towards Goal  Goal: *Labs within defined limits  Outcome: Progressing Towards Goal  Goal: *Absence of infection signs and symptoms  Outcome: Progressing Towards Goal  Goal: *Optimal pain control at patient's stated goal  Outcome: Progressing Towards Goal  Goal: *Skin integrity maintained  Outcome: Progressing Towards Goal  Goal: *Fluid volume balance  Outcome: Progressing Towards Goal  Goal: *Optimize nutritional status  Outcome: Progressing Towards Goal  Goal: *Anxiety reduced or absent  Outcome: Progressing Towards Goal  Goal: *Progressive mobility and function (eg: ADL's)  Outcome: Progressing Towards Goal     Problem: Patient Education: Go to Patient Education Activity  Goal: Patient/Family Education  Outcome: Progressing Towards Goal     Problem: Patient Education: Go to Patient Education Activity  Goal: Patient/Family Education  Outcome: Progressing Towards Goal Statement Selected

## 2023-09-06 NOTE — ED ADULT NURSE NOTE - CHPI ED NUR TIMING2
Pt here with 10/10 upper right abdominal pain that comes and goes x 3 months. Worse today, with N/V, pt 5 weeks pregnant, c/o white vaginal discharge the \"other day\". Pt extremely tearful, denies vag bleeding.    sudden onset

## 2023-09-06 NOTE — ED ADULT NURSE NOTE - PATIENT'S PREFERRED PRONOUN
Him/He
Anesthesia Volume In Cc: 0.3
Treatment Number (Will Not Render If 0): 1
Include Z78.9 (Other Specified Conditions Influencing Health Status) As An Associated Diagnosis?: No
Post-Care Instructions: I reviewed with the patient in detail post-care instructions. Patient is to wear sunprotection, and avoid picking at any of the treated lesions. Pt may apply Vaseline to crusted or scabbing areas.
Detail Level: Detailed
Consent: The patient's consent was obtained including but not limited to risks of crusting, scabbing, blistering, scarring, darker or lighter pigmentary change, recurrence, incomplete removal and infection.
Medical Necessity Clause: This procedure was medically necessary because the lesions that were treated were:
Medical Necessity Information: It is in your best interest to select a reason for this procedure from the list below. All of these items fulfill various CMS LCD requirements except the new and changing color options.

## 2023-09-06 NOTE — ED PROVIDER NOTE - PHYSICAL EXAMINATION
exam:   General: well appearing, NAD.   HEENT: eyes perrl, nose normal, OP no erythema/exudate/swelling.   cor: RRR, s1s2, 2+rad pulses.    lungs: ctabl, no resp distress.   abd: soft, ntnd.   neuro: a&ox3, cn2-12 intact, FLETCHER, 5/5 strength c nl sensation all extremities, nl coordination.   MSK: no extremity swelling.  Skin: normal, no rash

## 2023-09-06 NOTE — ED PROVIDER NOTE - CARE PROVIDER_API CALL
[facility]  Cardiovascular Disease  [address], [address]  [place] [address]  Phone: [phone]  Fax: [phone]  Follow Up Time: 1-3 Days

## 2023-09-06 NOTE — ED ADULT NURSE NOTE - NSFALLUNIVINTERV_ED_ALL_ED
Bed/Stretcher in lowest position, wheels locked, appropriate side rails in place/Call bell, personal items and telephone in reach/Instruct patient to call for assistance before getting out of bed/chair/stretcher/Non-slip footwear applied when patient is off stretcher/[place] to call system/Physically safe environment - no spills, clutter or unnecessary equipment/Purposeful proactive rounding/Room/bathroom lighting operational, light cord in reach

## 2023-09-06 NOTE — ED PROVIDER NOTE - NSFOLLOWUPINSTRUCTIONS_ED_ALL_ED_FT
- see cardiologist and your primary doctor this week    Follow Up in 1-3 Days with your own doctor or with  [address] [address]  Phone: [phone]    Chest Pain    WHAT YOU NEED TO KNOW:    Chest pain can be caused by a range of conditions, from not serious to life-threatening. Chest pain can be a symptom of a digestive problem, such as acid reflux or a stomach ulcer. An anxiety attack or a strong emotion, such as anger, can also cause chest pain. Infection, inflammation, or a fracture in the bones or cartilage in your chest can cause pain or discomfort. Sometimes chest pain or pressure is caused by poor blood flow to your heart (angina). Chest pain may also be caused by life-threatening conditions such as a heart attack or blood clot in your lungs.     DISCHARGE INSTRUCTIONS:    Call 911 if:   You have any of the following signs of a heart attack:   Squeezing, pressure, or pain in your chest      You may also have any of the following:   Discomfort or pain in your back, neck, jaw, stomach, or arm    Shortness of breath    Nausea or vomiting    Lightheadedness or a sudden cold sweat    Return to the emergency department if:     You have chest discomfort that gets worse, even with medicine.    You cough or vomit blood.     Your bowel movements are black or bloody.     You cannot stop vomiting, or it hurts to swallow.     Contact your healthcare provider if:     You have questions or concerns about your condition or care.        Medicines:     Medicines may be given to treat the cause of your chest pain. Examples include pain medicine, anxiety medicine, or medicines to increase blood flow to your heart.       Do not take certain medicines without asking your healthcare provider first. These include NSAIDs, herbal or vitamin supplements, or hormones (estrogen or progestin).       Take your medicine as directed. Contact your healthcare provider if you think your medicine is not helping or if you have side effects. Tell him or her if you are allergic to any medicine. Keep a list of the medicines, vitamins, and herbs you take. Include the amounts, and when and why you take them. Bring the list or the pill bottles to follow-up visits. Carry your medicine list with you in case of an emergency.    Follow up with your healthcare provider within 72 hours, or as directed: You may need to return for more tests to find the cause of your chest pain. You may be referred to a specialist, such as a cardiologist or gastroenterologist. Write down your questions so you remember to ask them during your visits.     Healthy living tips: The following are general healthy guidelines. If your chest pain is caused by a heart problem, your healthcare provider will give you specific guidelines to follow.    Do not smoke. Nicotine and other chemicals in cigarettes and cigars can cause lung and heart damage. Ask your healthcare provider for information if you currently smoke and need help to quit. E-cigarettes or smokeless tobacco still contain nicotine. Talk to your healthcare provider before you use these products.       Eat a variety of healthy, low-fat, low-salt foods. Healthy foods include fruits, vegetables, whole-grain breads, low-fat dairy products, beans, lean meats, and fish. Ask for more information about a heart healthy diet.    Drink plenty of water every day. Your body is made of mostly water. Water helps your body to control your temperature and blood pressure. Ask your healthcare provider how much water you should drink every day.    Ask about activity. Your healthcare provider will tell you which activities to limit or avoid. Ask when you can drive, return to work, and have sex. Ask about the best exercise plan for you.    Maintain a healthy weight. Ask your healthcare provider how much you should weigh. Ask him or her to help you create a weight loss plan if you are overweight.     Get the flu and pneumonia vaccines. All adults should get the influenza (flu) vaccine. Get it every year as soon as it becomes available. The pneumococcal vaccine is given to adults aged 65 years or older. The vaccine is given every 5 years to prevent pneumococcal disease, such as pneumonia.    If you have a stent:   Carry your stent card with you at all times.   Let all healthcare providers know that you have a stent.

## 2023-09-06 NOTE — ED ADULT NURSE NOTE - OBJECTIVE STATEMENT
Pt from home stated he was watching TV and started having chest discomfort for about 1 hour prior to coming, mild SOB

## 2023-09-07 ENCOUNTER — NON-APPOINTMENT (OUTPATIENT)
Age: 63
End: 2023-09-07

## 2024-05-01 ENCOUNTER — RESULT CHARGE (OUTPATIENT)
Age: 64
End: 2024-05-01

## 2024-05-01 ENCOUNTER — APPOINTMENT (OUTPATIENT)
Dept: FAMILY MEDICINE | Facility: HOSPITAL | Age: 64
End: 2024-05-01

## 2024-05-01 ENCOUNTER — OUTPATIENT (OUTPATIENT)
Dept: OUTPATIENT SERVICES | Facility: HOSPITAL | Age: 64
LOS: 1 days | End: 2024-05-01
Payer: COMMERCIAL

## 2024-05-01 VITALS
TEMPERATURE: 97.9 F | HEART RATE: 96 BPM | OXYGEN SATURATION: 98 % | WEIGHT: 217 LBS | SYSTOLIC BLOOD PRESSURE: 108 MMHG | RESPIRATION RATE: 17 BRPM | BODY MASS INDEX: 32.99 KG/M2 | DIASTOLIC BLOOD PRESSURE: 71 MMHG

## 2024-05-01 DIAGNOSIS — E78.5 HYPERLIPIDEMIA, UNSPECIFIED: ICD-10-CM

## 2024-05-01 DIAGNOSIS — E11.9 TYPE 2 DIABETES MELLITUS WITHOUT COMPLICATIONS: ICD-10-CM

## 2024-05-01 DIAGNOSIS — Z87.898 PERSONAL HISTORY OF OTHER SPECIFIED CONDITIONS: ICD-10-CM

## 2024-05-01 DIAGNOSIS — R42 DIZZINESS AND GIDDINESS: ICD-10-CM

## 2024-05-01 DIAGNOSIS — E66.9 OBESITY, UNSPECIFIED: ICD-10-CM

## 2024-05-01 DIAGNOSIS — E04.1 NONTOXIC SINGLE THYROID NODULE: ICD-10-CM

## 2024-05-01 DIAGNOSIS — Z00.00 ENCOUNTER FOR GENERAL ADULT MEDICAL EXAMINATION WITHOUT ABNORMAL FINDINGS: ICD-10-CM

## 2024-05-01 DIAGNOSIS — Z12.11 ENCOUNTER FOR SCREENING FOR MALIGNANT NEOPLASM OF COLON: ICD-10-CM

## 2024-05-01 DIAGNOSIS — I10 ESSENTIAL (PRIMARY) HYPERTENSION: ICD-10-CM

## 2024-05-01 DIAGNOSIS — Z78.9 OTHER SPECIFIED HEALTH STATUS: ICD-10-CM

## 2024-05-01 LAB — HBA1C MFR BLD HPLC: 7.1

## 2024-05-01 PROCEDURE — 80053 COMPREHEN METABOLIC PANEL: CPT

## 2024-05-01 PROCEDURE — 84443 ASSAY THYROID STIM HORMONE: CPT

## 2024-05-01 PROCEDURE — 80061 LIPID PANEL: CPT

## 2024-05-01 PROCEDURE — 85025 COMPLETE CBC W/AUTO DIFF WBC: CPT

## 2024-05-01 PROCEDURE — 36415 COLL VENOUS BLD VENIPUNCTURE: CPT

## 2024-05-01 PROCEDURE — G0463: CPT

## 2024-05-01 PROCEDURE — 83036 HEMOGLOBIN GLYCOSYLATED A1C: CPT

## 2024-05-01 PROCEDURE — 84439 ASSAY OF FREE THYROXINE: CPT

## 2024-05-01 RX ORDER — HYDROCHLOROTHIAZIDE 12.5 MG/1
12.5 TABLET ORAL DAILY
Qty: 90 | Refills: 3 | Status: DISCONTINUED | COMMUNITY
Start: 2022-02-24 | End: 2024-05-01

## 2024-05-01 RX ORDER — MECLIZINE HYDROCHLORIDE 12.5 MG/1
12.5 TABLET ORAL 3 TIMES DAILY
Qty: 12 | Refills: 0 | Status: ACTIVE | COMMUNITY
Start: 2024-05-01 | End: 1900-01-01

## 2024-05-01 RX ORDER — SEMAGLUTIDE 0.68 MG/ML
2 INJECTION, SOLUTION SUBCUTANEOUS
Qty: 4 | Refills: 0 | Status: COMPLETED | COMMUNITY
Start: 2023-06-23 | End: 2024-05-01

## 2024-05-01 RX ORDER — METFORMIN HYDROCHLORIDE 1000 MG/1
1000 TABLET, COATED ORAL TWICE DAILY
Qty: 60 | Refills: 5 | Status: DISCONTINUED | COMMUNITY
Start: 2023-06-29 | End: 2024-05-01

## 2024-05-01 RX ORDER — METFORMIN HYDROCHLORIDE 500 MG/1
500 TABLET, COATED ORAL DAILY
Qty: 90 | Refills: 2 | Status: ACTIVE | COMMUNITY
Start: 2024-05-01 | End: 1900-01-01

## 2024-05-01 RX ORDER — PEN NEEDLE, DIABETIC 29 G X1/2"
32G X 4 MM NEEDLE, DISPOSABLE MISCELLANEOUS
Qty: 1 | Refills: 4 | Status: COMPLETED | COMMUNITY
Start: 2023-06-27 | End: 2024-05-01

## 2024-05-02 NOTE — HISTORY OF PRESENT ILLNESS
[FreeTextEntry8] : Patient is a 63-year-old male with PMHx of type 2 diabetes, HTN, HLD, presenting for acute dizziness. Quit drinking about a year ago, down about 20 lbs.   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  - lasts about a minute or two, denies it feeling like room is spinning around him, described as more lightheaded, worsened with positional changes like laying to sittting - drinks about 30 oz of water a day - sick contact ~ 2 weeks ago exposure  - last eye exam 3 years ago   T2DM  - exercising very little - diet balanced, not a lot of fast food, stopped drinking 3/2023 - stopped metformin in August  - due for eye exam and foot exam   HTN - does not take BP at home  - currently on amlodipine, HCTZ, and losartan  - denies visual changes, blurred vision, chest pain, SOB, abdominal pain, numbness/tingling in extremities   No other questions or concerns at this time.

## 2024-05-02 NOTE — HEALTH RISK ASSESSMENT
[Never (0 pts)] : Never (0 points) [No] : In the past 12 months have you used drugs other than those required for medical reasons? No [No falls in past year] : Patient reported no falls in the past year [Little interest or pleasure doing things] : 1) Little interest or pleasure doing things [Feeling down, depressed, or hopeless] : 2) Feeling down, depressed, or hopeless [0] : 2) Feeling down, depressed, or hopeless: Not at all (0) [PHQ-2 Negative - No further assessment needed] : PHQ-2 Negative - No further assessment needed [Former] : Former [0-4] : 0-4 [< 15 Years] : < 15 Years [Audit-CScore] : 0 [de-identified] : little purposeful exercise  [de-identified] : well balanced, little fast food  [TNE1Ldjbd] : 0 [de-identified] : quit 10 yrs ago - .5pk/day for 10 years

## 2024-05-02 NOTE — PHYSICAL EXAM
[Comprehensive Foot Exam Normal] : Right and left foot were examined and both feet are normal. No ulcers in either foot. Toes are normal and with full ROM.  Normal tactile sensation with monofilament testing throughout both feet [No Acute Distress] : no acute distress [Well Nourished] : well nourished [Normal Sclera/Conjunctiva] : normal sclera/conjunctiva [PERRL] : pupils equal round and reactive to light [EOMI] : extraocular movements intact [Normal Outer Ear/Nose] : the outer ears and nose were normal in appearance [Normal Oropharynx] : the oropharynx was normal [No Respiratory Distress] : no respiratory distress  [No Accessory Muscle Use] : no accessory muscle use [Clear to Auscultation] : lungs were clear to auscultation bilaterally [Normal Rate] : normal rate  [Regular Rhythm] : with a regular rhythm [Normal S1, S2] : normal S1 and S2 [No Murmur] : no murmur heard [Pedal Pulses Present] : the pedal pulses are present [No Edema] : there was no peripheral edema [Soft] : abdomen soft [Non Tender] : non-tender [Non-distended] : non-distended [No Masses] : no abdominal mass palpated [Normal Bowel Sounds] : normal bowel sounds [No CVA Tenderness] : no CVA  tenderness [No Rash] : no rash [Coordination Grossly Intact] : coordination grossly intact [No Focal Deficits] : no focal deficits [Normal Gait] : normal gait [Normal Affect] : the affect was normal [Normal Insight/Judgement] : insight and judgment were intact [de-identified] : no nystagmus  [de-identified] : CN II-XII b/l intact, strength 5/5 b/l UE/LE, light sensation intact b/l UE/LE/face  [de-identified] : foot exam WNL today

## 2024-05-02 NOTE — COUNSELING
[Potential consequences of obesity discussed] : Potential consequences of obesity discussed [Benefits of weight loss discussed] : Benefits of weight loss discussed [Encouraged to increase physical activity] : Encouraged to increase physical activity [____ min/wk Activity] : [unfilled] min/wk activity [Good understanding] : Patient has a good understanding of disease, goals and obesity follow-up plan [FreeTextEntry4] : 15

## 2024-05-07 LAB
ALBUMIN SERPL ELPH-MCNC: 4.6 G/DL
ALP BLD-CCNC: 117 U/L
ALT SERPL-CCNC: 11 U/L
ANION GAP SERPL CALC-SCNC: 13 MMOL/L
AST SERPL-CCNC: 10 U/L
BASOPHILS # BLD AUTO: 0.03 K/UL
BASOPHILS NFR BLD AUTO: 0.5 %
BILIRUB SERPL-MCNC: 1 MG/DL
BUN SERPL-MCNC: 14 MG/DL
CALCIUM SERPL-MCNC: 9.8 MG/DL
CHLORIDE SERPL-SCNC: 101 MMOL/L
CHOLEST SERPL-MCNC: 115 MG/DL
CO2 SERPL-SCNC: 27 MMOL/L
CREAT SERPL-MCNC: 1.06 MG/DL
EGFR: 78 ML/MIN/1.73M2
EOSINOPHIL # BLD AUTO: 0.13 K/UL
EOSINOPHIL NFR BLD AUTO: 2.3 %
GLUCOSE SERPL-MCNC: 127 MG/DL
HCT VFR BLD CALC: 38.6 %
HDLC SERPL-MCNC: 51 MG/DL
HGB BLD-MCNC: 12.4 G/DL
IMM GRANULOCYTES NFR BLD AUTO: 0.2 %
LDLC SERPL CALC-MCNC: 47 MG/DL
LYMPHOCYTES # BLD AUTO: 1.68 K/UL
LYMPHOCYTES NFR BLD AUTO: 29.4 %
MAN DIFF?: NORMAL
MCHC RBC-ENTMCNC: 28.1 PG
MCHC RBC-ENTMCNC: 32.1 GM/DL
MCV RBC AUTO: 87.5 FL
MONOCYTES # BLD AUTO: 0.5 K/UL
MONOCYTES NFR BLD AUTO: 8.8 %
NEUTROPHILS # BLD AUTO: 3.36 K/UL
NEUTROPHILS NFR BLD AUTO: 58.8 %
NONHDLC SERPL-MCNC: 63 MG/DL
PLATELET # BLD AUTO: 336 K/UL
POTASSIUM SERPL-SCNC: 3.7 MMOL/L
PROT SERPL-MCNC: 7 G/DL
RBC # BLD: 4.41 M/UL
RBC # FLD: 13.6 %
SODIUM SERPL-SCNC: 141 MMOL/L
T4 FREE SERPL-MCNC: 1.5 NG/DL
TRIGL SERPL-MCNC: 83 MG/DL
TSH SERPL-ACNC: 0.07 UIU/ML
WBC # FLD AUTO: 5.71 K/UL

## 2024-05-08 ENCOUNTER — APPOINTMENT (OUTPATIENT)
Dept: FAMILY MEDICINE | Facility: HOSPITAL | Age: 64
End: 2024-05-08

## 2024-05-20 ENCOUNTER — APPOINTMENT (OUTPATIENT)
Dept: FAMILY MEDICINE | Facility: HOSPITAL | Age: 64
End: 2024-05-20

## 2024-05-20 ENCOUNTER — OUTPATIENT (OUTPATIENT)
Dept: OUTPATIENT SERVICES | Facility: HOSPITAL | Age: 64
LOS: 1 days | End: 2024-05-20
Payer: COMMERCIAL

## 2024-05-20 VITALS
DIASTOLIC BLOOD PRESSURE: 73 MMHG | RESPIRATION RATE: 14 BRPM | BODY MASS INDEX: 33.76 KG/M2 | OXYGEN SATURATION: 99 % | SYSTOLIC BLOOD PRESSURE: 130 MMHG | WEIGHT: 222 LBS | HEART RATE: 89 BPM | TEMPERATURE: 98.1 F

## 2024-05-20 DIAGNOSIS — E11.9 TYPE 2 DIABETES MELLITUS WITHOUT COMPLICATIONS: ICD-10-CM

## 2024-05-20 DIAGNOSIS — R42 DIZZINESS AND GIDDINESS: ICD-10-CM

## 2024-05-20 DIAGNOSIS — M79.9 SOFT TISSUE DISORDER, UNSPECIFIED: ICD-10-CM

## 2024-05-20 DIAGNOSIS — E04.1 NONTOXIC SINGLE THYROID NODULE: ICD-10-CM

## 2024-05-20 DIAGNOSIS — I16.0 HYPERTENSIVE URGENCY: ICD-10-CM

## 2024-05-20 DIAGNOSIS — D64.9 ANEMIA, UNSPECIFIED: ICD-10-CM

## 2024-05-20 DIAGNOSIS — Z23 ENCOUNTER FOR IMMUNIZATION: ICD-10-CM

## 2024-05-20 DIAGNOSIS — Z00.00 ENCOUNTER FOR GENERAL ADULT MEDICAL EXAMINATION WITHOUT ABNORMAL FINDINGS: ICD-10-CM

## 2024-05-20 DIAGNOSIS — E11.9 TYPE 2 DIABETES MELLITUS W/OUT COMPLICATIONS: ICD-10-CM

## 2024-05-20 DIAGNOSIS — Z87.898 PERSONAL HISTORY OF OTHER SPECIFIED CONDITIONS: ICD-10-CM

## 2024-05-20 PROCEDURE — G0463: CPT

## 2024-06-01 NOTE — HISTORY OF PRESENT ILLNESS
[FreeTextEntry1] : BP F/U [de-identified] : Patient is a 63-year-old male with PMHx of type 2 diabetes (last A1c was 7.1), HTN, HLD, presenting to office for blood pressure follow-up. At last visit, HCTZ was discontinued with concern for orthostatic hypotension. Patient stating dizziness has mostly resolved, did not need to use meclizine. BP today 130/73. Patient did not get thyroid US repeated or send in FIT test. Reminded him of the importance of both of these given his normocytic anemia and his thyroid nodule noted in 2021 with recommendation for FNA. Patient also concerned with L flank pain noted, intermittently, no longer present but noticed "bump" in the region he was having pain. Kidney fxn from last tests WNL, no Fhx of renal cancers/problems. Will obtain US for this as well. Aware he is due for eye exam, would prefer to find his own opthalmologist, aware to specify at visit that he is diabetic for adequate exam. No further questions or concerns at this time.

## 2024-06-01 NOTE — PHYSICAL EXAM
[No Acute Distress] : no acute distress [Well Nourished] : well nourished [Normal Sclera/Conjunctiva] : normal sclera/conjunctiva [PERRL] : pupils equal round and reactive to light [No Respiratory Distress] : no respiratory distress  [No Accessory Muscle Use] : no accessory muscle use [Clear to Auscultation] : lungs were clear to auscultation bilaterally [Normal Rate] : normal rate  [Regular Rhythm] : with a regular rhythm [Normal S1, S2] : normal S1 and S2 [No Murmur] : no murmur heard [No Edema] : there was no peripheral edema [Soft] : abdomen soft [Non Tender] : non-tender [Normal Bowel Sounds] : normal bowel sounds [No CVA Tenderness] : no CVA  tenderness [No Focal Deficits] : no focal deficits [Normal Gait] : normal gait [Normal Affect] : the affect was normal [Normal Insight/Judgement] : insight and judgment were intact [de-identified] : soft tissue lesion 1x1in, mobile but unable to ascertain whether subcutaneous or continuous with L kidney

## 2024-06-05 ENCOUNTER — RESULT REVIEW (OUTPATIENT)
Age: 64
End: 2024-06-05

## 2024-06-05 ENCOUNTER — OUTPATIENT (OUTPATIENT)
Dept: OUTPATIENT SERVICES | Facility: HOSPITAL | Age: 64
LOS: 1 days | End: 2024-06-05
Payer: COMMERCIAL

## 2024-06-05 DIAGNOSIS — Z00.00 ENCOUNTER FOR GENERAL ADULT MEDICAL EXAMINATION WITHOUT ABNORMAL FINDINGS: ICD-10-CM

## 2024-06-05 DIAGNOSIS — E04.1 NONTOXIC SINGLE THYROID NODULE: ICD-10-CM

## 2024-06-05 PROCEDURE — 76536 US EXAM OF HEAD AND NECK: CPT

## 2024-06-05 PROCEDURE — 76536 US EXAM OF HEAD AND NECK: CPT | Mod: 26

## 2024-07-09 ENCOUNTER — APPOINTMENT (OUTPATIENT)
Dept: ENDOCRINOLOGY | Facility: CLINIC | Age: 64
End: 2024-07-09
Payer: COMMERCIAL

## 2024-07-09 VITALS
BODY MASS INDEX: 32.43 KG/M2 | RESPIRATION RATE: 17 BRPM | WEIGHT: 214 LBS | HEART RATE: 92 BPM | DIASTOLIC BLOOD PRESSURE: 80 MMHG | SYSTOLIC BLOOD PRESSURE: 132 MMHG | OXYGEN SATURATION: 99 % | HEIGHT: 68 IN | TEMPERATURE: 98.2 F

## 2024-07-09 DIAGNOSIS — E04.1 NONTOXIC SINGLE THYROID NODULE: ICD-10-CM

## 2024-07-09 DIAGNOSIS — E05.90 THYROTOXICOSIS, UNSPECIFIED W/OUT THYROTOXIC CRISIS OR STORM: ICD-10-CM

## 2024-07-09 PROCEDURE — 99204 OFFICE O/P NEW MOD 45 MIN: CPT

## 2024-07-10 LAB
ALBUMIN SERPL ELPH-MCNC: 4.7 G/DL
ALP BLD-CCNC: 110 U/L
ANION GAP SERPL CALC-SCNC: 13 MMOL/L
AST SERPL-CCNC: 11 U/L
BILIRUB SERPL-MCNC: 0.5 MG/DL
BUN SERPL-MCNC: 12 MG/DL
CALCIUM SERPL-MCNC: 9.2 MG/DL
CHLORIDE SERPL-SCNC: 102 MMOL/L
CO2 SERPL-SCNC: 26 MMOL/L
CREAT SERPL-MCNC: 1.09 MG/DL
EGFR: 75 ML/MIN/1.73M2
ERYTHROCYTE [SEDIMENTATION RATE] IN BLOOD BY WESTERGREN METHOD: 13 MM/HR
GLUCOSE SERPL-MCNC: 91 MG/DL
HCT VFR BLD CALC: 36.7 %
HGB BLD-MCNC: 11.3 G/DL
MCHC RBC-ENTMCNC: 27.3 PG
MCHC RBC-ENTMCNC: 30.8 GM/DL
MCV RBC AUTO: 88.6 FL
PLATELET # BLD AUTO: 338 K/UL
POTASSIUM SERPL-SCNC: 3.9 MMOL/L
PROT SERPL-MCNC: 7.1 G/DL
RBC # BLD: 4.14 M/UL
RBC # FLD: 13.7 %
SODIUM SERPL-SCNC: 142 MMOL/L
T3 SERPL-MCNC: 138 NG/DL
T4 FREE SERPL-MCNC: 1.5 NG/DL
TSH RECEPTOR AB: <1.1 IU/L
TSH SERPL-ACNC: 0.21 UIU/ML
WBC # FLD AUTO: 4.87 K/UL

## 2024-07-11 LAB
THYROGLOB AB SERPL-ACNC: <20 IU/ML
THYROGLOB SERPL-MCNC: 37.1 NG/ML
THYROPEROXIDASE AB SERPL IA-ACNC: <10 IU/ML

## 2024-07-12 LAB — TSI ACT/NOR SER: <0.1 IU/L

## 2024-07-30 ENCOUNTER — APPOINTMENT (OUTPATIENT)
Dept: GASTROENTEROLOGY | Facility: CLINIC | Age: 64
End: 2024-07-30
Payer: COMMERCIAL

## 2024-07-30 VITALS
RESPIRATION RATE: 16 BRPM | SYSTOLIC BLOOD PRESSURE: 121 MMHG | HEART RATE: 91 BPM | HEIGHT: 68 IN | TEMPERATURE: 97.7 F | WEIGHT: 214 LBS | BODY MASS INDEX: 32.43 KG/M2 | OXYGEN SATURATION: 97 % | DIASTOLIC BLOOD PRESSURE: 76 MMHG

## 2024-07-30 DIAGNOSIS — K21.9 GASTRO-ESOPHAGEAL REFLUX DISEASE W/OUT ESOPHAGITIS: ICD-10-CM

## 2024-07-30 DIAGNOSIS — D64.9 ANEMIA, UNSPECIFIED: ICD-10-CM

## 2024-07-30 DIAGNOSIS — Z12.11 ENCOUNTER FOR SCREENING FOR MALIGNANT NEOPLASM OF COLON: ICD-10-CM

## 2024-07-30 PROCEDURE — 99204 OFFICE O/P NEW MOD 45 MIN: CPT

## 2024-07-30 RX ORDER — POLYETHYLENE GLYCOL 3350, SODIUM CHLORIDE, SODIUM BICARBONATE AND POTASSIUM CHLORIDE WITH LEMON FLAVOR 420; 11.2; 5.72; 1.48 G/4L; G/4L; G/4L; G/4L
420 POWDER, FOR SOLUTION ORAL
Qty: 1 | Refills: 0 | Status: COMPLETED | COMMUNITY
Start: 2024-07-30 | End: 2024-07-31

## 2024-07-30 NOTE — ASSESSMENT
[FreeTextEntry1] : Get EGD and colonoscopy ASAP. He agrees.  Explained the risks, benefits, indications, alternatives. The risks include but are not limited to bleeding, infection, perforation, reaction to anesthesia, or missed lesions. The patient verbalized understanding and wishes to proceed.  Split-dose Gavilyte-N.  Advised verbally and in writing to hold metformin on night before test and morning of test. He agrees.

## 2024-07-30 NOTE — REVIEW OF SYSTEMS
[As Noted in HPI] : as noted in HPI [Heartburn] : heartburn [Bleeding] : bleeding [Bloating (gassiness)] : bloating [Negative] : Heme/Lymph

## 2024-07-30 NOTE — HISTORY OF PRESENT ILLNESS
[FreeTextEntry1] : Referred for anemia, reported rectal bleeding. Also c/o GERD symptoms w/heartburn, longstanding. Denies previous EGD or colonoscopy. Had occult blood stool testing in 2021 that was negative, Unknown paternal family history. Denies maternal history of CRC, gastric cancer. Some weight loss noted which patient attributes to somewhat healthier lifestyle. Denies n/v, change in bowel pattern. Denies dysphagia, odynophagia.

## 2024-07-30 NOTE — PHYSICAL EXAM
[Alert] : alert [Normal Voice/Communication] : normal voice/communication [No Acute Distress] : no acute distress [Obese (BMI >= 30)] : obese (BMI >= 30) [Sclera] : the sclera and conjunctiva were normal [Hearing Threshold Finger Rub Not [place]] : hearing was normal [Normal Lips/Gums] : the lips and gums were normal [Oropharynx] : the oropharynx was normal [Normal Appearance] : the appearance of the neck was normal [No Neck Mass] : no neck mass was observed [No Respiratory Distress] : no respiratory distress [No Acc Muscle Use] : no accessory muscle use [Respiration, Rhythm And Depth] : normal respiratory rhythm and effort [Auscultation Breath Sounds / Voice Sounds] : lungs were clear to auscultation bilaterally [Heart Rate And Rhythm] : heart rate was normal and rhythm regular [Normal S1, S2] : normal S1 and S2 [Murmurs] : no murmurs [Bowel Sounds] : normal bowel sounds [Abdomen Tenderness] : non-tender [No Masses] : no abdominal mass palpated [Abdomen Soft] : soft [] : no hepatosplenomegaly [No CVA Tenderness] : no CVA  tenderness [Involuntary Movements] : no involuntary movements were seen [Normal Color / Pigmentation] : normal skin color and pigmentation [No Focal Deficits] : no focal deficits [Oriented To Time, Place, And Person] : oriented to person, place, and time

## 2024-08-02 ENCOUNTER — OUTPATIENT (OUTPATIENT)
Dept: OUTPATIENT SERVICES | Facility: HOSPITAL | Age: 64
LOS: 1 days | End: 2024-08-02
Payer: COMMERCIAL

## 2024-08-02 ENCOUNTER — RESULT REVIEW (OUTPATIENT)
Age: 64
End: 2024-08-02

## 2024-08-02 ENCOUNTER — APPOINTMENT (OUTPATIENT)
Dept: GASTROENTEROLOGY | Facility: HOSPITAL | Age: 64
End: 2024-08-02

## 2024-08-02 DIAGNOSIS — D64.9 ANEMIA, UNSPECIFIED: ICD-10-CM

## 2024-08-02 DIAGNOSIS — Z12.11 ENCOUNTER FOR SCREENING FOR MALIGNANT NEOPLASM OF COLON: ICD-10-CM

## 2024-08-02 LAB — GLUCOSE BLDC GLUCOMTR-MCNC: 110 MG/DL — HIGH (ref 70–99)

## 2024-08-02 PROCEDURE — 88312 SPECIAL STAINS GROUP 1: CPT | Mod: 26

## 2024-08-02 PROCEDURE — 43239 EGD BIOPSY SINGLE/MULTIPLE: CPT

## 2024-08-02 PROCEDURE — 45335 SIGMOIDOSCOPY W/SUBMUC INJ: CPT

## 2024-08-02 PROCEDURE — 88341 IMHCHEM/IMCYTCHM EA ADD ANTB: CPT | Mod: 26

## 2024-08-02 PROCEDURE — 88342 IMHCHEM/IMCYTCHM 1ST ANTB: CPT | Mod: 26

## 2024-08-02 PROCEDURE — 88342 IMHCHEM/IMCYTCHM 1ST ANTB: CPT

## 2024-08-02 PROCEDURE — 88341 IMHCHEM/IMCYTCHM EA ADD ANTB: CPT

## 2024-08-02 PROCEDURE — 88305 TISSUE EXAM BY PATHOLOGIST: CPT

## 2024-08-02 PROCEDURE — 45331 SIGMOIDOSCOPY AND BIOPSY: CPT

## 2024-08-02 PROCEDURE — 88305 TISSUE EXAM BY PATHOLOGIST: CPT | Mod: 26

## 2024-08-02 PROCEDURE — 88312 SPECIAL STAINS GROUP 1: CPT

## 2024-08-02 PROCEDURE — 82962 GLUCOSE BLOOD TEST: CPT

## 2024-08-02 RX ORDER — BACTERIOSTATIC SODIUM CHLORIDE 0.9 %
500 VIAL (ML) INJECTION
Refills: 0 | Status: ACTIVE | OUTPATIENT
Start: 2024-08-02

## 2024-08-05 LAB — SURGICAL PATHOLOGY STUDY: SIGNIFICANT CHANGE UP

## 2024-08-06 ENCOUNTER — APPOINTMENT (OUTPATIENT)
Dept: SURGERY | Facility: CLINIC | Age: 64
End: 2024-08-06

## 2024-08-06 ENCOUNTER — RESULT REVIEW (OUTPATIENT)
Age: 64
End: 2024-08-06

## 2024-08-06 ENCOUNTER — APPOINTMENT (OUTPATIENT)
Dept: FAMILY MEDICINE | Facility: HOSPITAL | Age: 64
End: 2024-08-06

## 2024-08-06 PROBLEM — Z01.818 PREOPERATIVE EXAMINATION: Status: ACTIVE | Noted: 2024-08-05

## 2024-08-06 PROCEDURE — 99203 OFFICE O/P NEW LOW 30 MIN: CPT

## 2024-08-06 NOTE — HISTORY OF PRESENT ILLNESS
[No Pertinent Cardiac History] : no history of aortic stenosis, atrial fibrillation, coronary artery disease, recent myocardial infarction, or implantable device/pacemaker [No Pertinent Pulmonary History] : no history of asthma, COPD, sleep apnea, or smoking [(Patient denies any chest pain, claudication, dyspnea on exertion, orthopnea, palpitations or syncope)] : Patient denies any chest pain, claudication, dyspnea on exertion, orthopnea, palpitations or syncope [Aortic Stenosis] : no aortic stenosis [Atrial Fibrillation] : no atrial fibrillation [Coronary Artery Disease] : no coronary artery disease [Recent Myocardial Infarction] : no recent myocardial infarction [Implantable Device/Pacemaker] : no implantable device/pacemaker [Asthma] : no asthma [COPD] : no COPD [Sleep Apnea] : no sleep apnea [Smoker] : not a smoker [Chronic Anticoagulation] : no chronic anticoagulation [Chronic Kidney Disease] : no chronic kidney disease [Diabetes] : no diabetes [FreeTextEntry4] : Patient is a 64-year-old male with PMHx of  type 2 diabetes (last A1c was 7.1), HTN, HLD, multinodular thyroid goiter, normocytic anemia, s/p recent colonoscopy, found to have a near-obstructing rectosigmoid mass, 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   Of note, EGD with reflux esophagitis and erosive gastritis, negative H pylori. [FreeTextEntry7] : EKG 6/23/23: NSR, LAD, w/o ST changes

## 2024-08-08 PROBLEM — C19 MALIGNANT NEOPLASM OF RECTOSIGMOID (COLON): Status: ACTIVE | Noted: 2024-08-02

## 2024-08-08 PROBLEM — Z87.19 HISTORY OF RECTAL BLEEDING: Status: ACTIVE | Noted: 2024-08-08

## 2024-08-08 NOTE — HISTORY OF PRESENT ILLNESS
[de-identified] : This 64 year old man has been passing BRBPR for the past three months. He has also noticed a change in bowel habits with narrow stools.. He has been eating well, but has lost as significant amount of weight over the past six months. Colonoscopy by Dr. Hennessy demonstrated a tumor at co cm.

## 2024-08-08 NOTE — REVIEW OF SYSTEMS
[Heart Rate Is Slow] : the heart rate was not slow [Chest Pain] : no chest pain [Constipation] : constipation [Negative] : Eyes

## 2024-08-08 NOTE — ASSESSMENT
[FreeTextEntry1] : Discussion regarding all options and risks To undergo CT scan of abdomen To undergo anterior resection on 8/15/24 Bowel prep discussed All lab values and imaging studies reviewed Discussed with Medicine

## 2024-08-08 NOTE — PHYSICAL EXAM
[Normal Breath Sounds] : Normal breath sounds [Normal Heart Sounds] : normal heart sounds [Normal Rate and Rhythm] : normal rate and rhythm [Abdominal Masses] : No abdominal masses [Abdomen Tenderness] : ~T ~M No abdominal tenderness [No Rash or Lesion] : No rash or lesion [de-identified] : nl [de-identified] : nl [de-identified] : nl [de-identified] : nl

## 2024-08-10 ENCOUNTER — NON-APPOINTMENT (OUTPATIENT)
Age: 64
End: 2024-08-10

## 2024-08-12 ENCOUNTER — APPOINTMENT (OUTPATIENT)
Dept: CT IMAGING | Facility: HOSPITAL | Age: 64
End: 2024-08-12
Payer: COMMERCIAL

## 2024-08-12 PROCEDURE — 71260 CT THORAX DX C+: CPT | Mod: 26

## 2024-08-12 PROCEDURE — 74177 CT ABD & PELVIS W/CONTRAST: CPT | Mod: 26

## 2024-08-13 ENCOUNTER — APPOINTMENT (OUTPATIENT)
Dept: FAMILY MEDICINE | Facility: HOSPITAL | Age: 64
End: 2024-08-13

## 2024-08-13 RX ORDER — DEXTROSE 15 G/33 G
25 GEL IN PACKET (GRAM) ORAL ONCE
Refills: 0 | Status: DISCONTINUED | OUTPATIENT
Start: 2024-08-15 | End: 2024-08-19

## 2024-08-13 RX ORDER — GLUCAGON INJECTION, SOLUTION 1 MG/.2ML
1 INJECTION, SOLUTION SUBCUTANEOUS ONCE
Refills: 0 | Status: DISCONTINUED | OUTPATIENT
Start: 2024-08-15 | End: 2024-08-19

## 2024-08-13 NOTE — HISTORY OF PRESENT ILLNESS
[Diabetes] : diabetes [No Pertinent Cardiac History] : no history of aortic stenosis, atrial fibrillation, coronary artery disease, recent myocardial infarction, or implantable device/pacemaker [No Pertinent Pulmonary History] : no history of asthma, COPD, sleep apnea, or smoking [No Adverse Anesthesia Reaction] : no adverse anesthesia reaction in self or family member [Chronic Anticoagulation] : no chronic anticoagulation [Chronic Kidney Disease] : no chronic kidney disease [(Patient denies any chest pain, claudication, dyspnea on exertion, orthopnea, palpitations or syncope)] : Patient denies any chest pain, claudication, dyspnea on exertion, orthopnea, palpitations or syncope [FreeTextEntry1] : L colectomy w/ anastomosis vs colectomy [FreeTextEntry2] : 8/15/24 [FreeTextEntry3] : Dr Whiting [FreeTextEntry4] : Patient david 64-year-old male with PMHx of type 2 diabetes, HTN, HLD, now found to have sigmoid mass on colonoscopy, pathology w/ mod differentiated adenocarcinoma, presenting for pre-op clearance appt.   CT chest/AP w/ primary distal sigmoid mass 5cm causing luminal narrowing w/o definitive evidence of metastatic disease, few tiny sub 4mm pulm nodules

## 2024-08-14 ENCOUNTER — TRANSCRIPTION ENCOUNTER (OUTPATIENT)
Age: 64
End: 2024-08-14

## 2024-08-14 ENCOUNTER — APPOINTMENT (OUTPATIENT)
Dept: FAMILY MEDICINE | Facility: HOSPITAL | Age: 64
End: 2024-08-14

## 2024-08-14 VITALS
HEART RATE: 90 BPM | BODY MASS INDEX: 31.93 KG/M2 | OXYGEN SATURATION: 99 % | WEIGHT: 210 LBS | RESPIRATION RATE: 16 BRPM | TEMPERATURE: 97.8 F | DIASTOLIC BLOOD PRESSURE: 75 MMHG | SYSTOLIC BLOOD PRESSURE: 121 MMHG

## 2024-08-14 DIAGNOSIS — R39.198 OTHER DIFFICULTIES WITH MICTURITION: ICD-10-CM

## 2024-08-14 DIAGNOSIS — Z01.818 ENCOUNTER FOR OTHER PREPROCEDURAL EXAMINATION: ICD-10-CM

## 2024-08-14 PROBLEM — C18.9 MALIGNANT NEOPLASM OF COLON, UNSPECIFIED: Chronic | Status: ACTIVE | Noted: 2024-08-13

## 2024-08-14 NOTE — PHYSICAL EXAM
[No Acute Distress] : no acute distress [Well Nourished] : well nourished [Normal Sclera/Conjunctiva] : normal sclera/conjunctiva [PERRL] : pupils equal round and reactive to light [EOMI] : extraocular movements intact [Normal Outer Ear/Nose] : the outer ears and nose were normal in appearance [Normal Oropharynx] : the oropharynx was normal [No JVD] : no jugular venous distention [No Lymphadenopathy] : no lymphadenopathy [Supple] : supple [No Respiratory Distress] : no respiratory distress  [No Accessory Muscle Use] : no accessory muscle use [Clear to Auscultation] : lungs were clear to auscultation bilaterally [Normal Rate] : normal rate  [Regular Rhythm] : with a regular rhythm [Normal S1, S2] : normal S1 and S2 [No Murmur] : no murmur heard [No Carotid Bruits] : no carotid bruits [Pedal Pulses Present] : the pedal pulses are present [No Edema] : there was no peripheral edema [No Extremity Clubbing/Cyanosis] : no extremity clubbing/cyanosis [Soft] : abdomen soft [Non Tender] : non-tender [Non-distended] : non-distended [Normal Bowel Sounds] : normal bowel sounds [Normal Posterior Cervical Nodes] : no posterior cervical lymphadenopathy [Normal Anterior Cervical Nodes] : no anterior cervical lymphadenopathy [No CVA Tenderness] : no CVA  tenderness [No Spinal Tenderness] : no spinal tenderness [No Joint Swelling] : no joint swelling [Grossly Normal Strength/Tone] : grossly normal strength/tone [No Rash] : no rash [Coordination Grossly Intact] : coordination grossly intact [No Focal Deficits] : no focal deficits [Normal Gait] : normal gait [Normal Affect] : the affect was normal [Normal Insight/Judgement] : insight and judgment were intact

## 2024-08-15 ENCOUNTER — APPOINTMENT (OUTPATIENT)
Dept: SURGERY | Facility: HOSPITAL | Age: 64
End: 2024-08-15

## 2024-08-15 ENCOUNTER — RESULT REVIEW (OUTPATIENT)
Age: 64
End: 2024-08-15

## 2024-08-15 ENCOUNTER — INPATIENT (INPATIENT)
Facility: HOSPITAL | Age: 64
LOS: 3 days | Discharge: HOME | DRG: 331 | End: 2024-08-19
Attending: FAMILY MEDICINE | Admitting: SURGERY
Payer: COMMERCIAL

## 2024-08-15 VITALS
TEMPERATURE: 98 F | HEART RATE: 67 BPM | OXYGEN SATURATION: 98 % | HEIGHT: 68.5 IN | RESPIRATION RATE: 16 BRPM | SYSTOLIC BLOOD PRESSURE: 132 MMHG | WEIGHT: 210.32 LBS | DIASTOLIC BLOOD PRESSURE: 78 MMHG

## 2024-08-15 DIAGNOSIS — C19 MALIGNANT NEOPLASM OF RECTOSIGMOID JUNCTION: ICD-10-CM

## 2024-08-15 DIAGNOSIS — Z98.890 OTHER SPECIFIED POSTPROCEDURAL STATES: Chronic | ICD-10-CM

## 2024-08-15 LAB
ABO RH CONFIRMATION: SIGNIFICANT CHANGE UP
ALBUMIN SERPL ELPH-MCNC: 3.4 G/DL — SIGNIFICANT CHANGE UP (ref 3.3–5)
ALP SERPL-CCNC: 95 U/L — SIGNIFICANT CHANGE UP (ref 40–120)
ALT FLD-CCNC: 19 U/L — SIGNIFICANT CHANGE UP (ref 10–45)
ANION GAP SERPL CALC-SCNC: 10 MMOL/L — SIGNIFICANT CHANGE UP (ref 5–17)
AST SERPL-CCNC: 16 U/L — SIGNIFICANT CHANGE UP (ref 10–40)
BILIRUB SERPL-MCNC: 0.7 MG/DL — SIGNIFICANT CHANGE UP (ref 0.2–1.2)
BUN SERPL-MCNC: 8 MG/DL — SIGNIFICANT CHANGE UP (ref 7–23)
CALCIUM SERPL-MCNC: 8.6 MG/DL — SIGNIFICANT CHANGE UP (ref 8.4–10.5)
CHLORIDE SERPL-SCNC: 102 MMOL/L — SIGNIFICANT CHANGE UP (ref 96–108)
CO2 SERPL-SCNC: 28 MMOL/L — SIGNIFICANT CHANGE UP (ref 22–31)
CREAT SERPL-MCNC: 1.15 MG/DL — SIGNIFICANT CHANGE UP (ref 0.5–1.3)
EGFR: 70 ML/MIN/1.73M2 — SIGNIFICANT CHANGE UP
GLUCOSE BLDC GLUCOMTR-MCNC: 171 MG/DL — HIGH (ref 70–99)
GLUCOSE BLDC GLUCOMTR-MCNC: 177 MG/DL — HIGH (ref 70–99)
GLUCOSE BLDC GLUCOMTR-MCNC: 185 MG/DL — HIGH (ref 70–99)
GLUCOSE BLDC GLUCOMTR-MCNC: 192 MG/DL — HIGH (ref 70–99)
GLUCOSE SERPL-MCNC: 162 MG/DL — HIGH (ref 70–99)
HCT VFR BLD CALC: 33.4 % — LOW (ref 39–50)
HGB BLD-MCNC: 11.1 G/DL — LOW (ref 13–17)
MCHC RBC-ENTMCNC: 28.2 PG — SIGNIFICANT CHANGE UP (ref 27–34)
MCHC RBC-ENTMCNC: 33.2 GM/DL — SIGNIFICANT CHANGE UP (ref 32–36)
MCV RBC AUTO: 85 FL — SIGNIFICANT CHANGE UP (ref 80–100)
NRBC # BLD: 0 /100 WBCS — SIGNIFICANT CHANGE UP (ref 0–0)
PLATELET # BLD AUTO: 290 K/UL — SIGNIFICANT CHANGE UP (ref 150–400)
POTASSIUM SERPL-MCNC: 3.8 MMOL/L — SIGNIFICANT CHANGE UP (ref 3.5–5.3)
POTASSIUM SERPL-SCNC: 3.8 MMOL/L — SIGNIFICANT CHANGE UP (ref 3.5–5.3)
PROT SERPL-MCNC: 6.6 G/DL — SIGNIFICANT CHANGE UP (ref 6–8.3)
RBC # BLD: 3.93 M/UL — LOW (ref 4.2–5.8)
RBC # FLD: 13.2 % — SIGNIFICANT CHANGE UP (ref 10.3–14.5)
SODIUM SERPL-SCNC: 140 MMOL/L — SIGNIFICANT CHANGE UP (ref 135–145)
WBC # BLD: 11.84 K/UL — HIGH (ref 3.8–10.5)
WBC # FLD AUTO: 11.84 K/UL — HIGH (ref 3.8–10.5)

## 2024-08-15 PROCEDURE — 99223 1ST HOSP IP/OBS HIGH 75: CPT

## 2024-08-15 PROCEDURE — 44143 PARTIAL REMOVAL OF COLON: CPT

## 2024-08-15 PROCEDURE — 88309 TISSUE EXAM BY PATHOLOGIST: CPT | Mod: 26

## 2024-08-15 PROCEDURE — 44143 PARTIAL REMOVAL OF COLON: CPT | Mod: AS

## 2024-08-15 DEVICE — STAPLER COVIDIEN TRI-STAPLE 60MM PURPLE RELOAD: Type: IMPLANTABLE DEVICE | Status: FUNCTIONAL

## 2024-08-15 DEVICE — GUIDEWIRE SENSOR NITINOL STRAIGHT .038" X 150CM: Type: IMPLANTABLE DEVICE | Status: FUNCTIONAL

## 2024-08-15 DEVICE — CLIP APPLIER ETHICON LIGACLIP 13" LARGE: Type: IMPLANTABLE DEVICE | Status: FUNCTIONAL

## 2024-08-15 DEVICE — CATH CONE TIP 5FR: Type: IMPLANTABLE DEVICE | Status: FUNCTIONAL

## 2024-08-15 DEVICE — STAPLER COVIDIEN ENDO GIA 80-3.8MM BLUE RELOAD: Type: IMPLANTABLE DEVICE | Status: FUNCTIONAL

## 2024-08-15 DEVICE — URETERAL CATH FLEXIMA OPEN END 5FR 70CM: Type: IMPLANTABLE DEVICE | Status: FUNCTIONAL

## 2024-08-15 DEVICE — IMPLANTABLE DEVICE: Type: IMPLANTABLE DEVICE | Status: FUNCTIONAL

## 2024-08-15 RX ORDER — DEXTROSE 15 G/33 G
25 GEL IN PACKET (GRAM) ORAL ONCE
Refills: 0 | Status: DISCONTINUED | OUTPATIENT
Start: 2024-08-15 | End: 2024-08-19

## 2024-08-15 RX ORDER — CEFOTETAN 2 G/1
2 INJECTION, POWDER, FOR SOLUTION INTRAMUSCULAR; INTRAVENOUS EVERY 12 HOURS
Refills: 0 | Status: COMPLETED | OUTPATIENT
Start: 2024-08-15 | End: 2024-08-15

## 2024-08-15 RX ORDER — ENOXAPARIN SODIUM 100 MG/ML
40 INJECTION SUBCUTANEOUS EVERY 24 HOURS
Refills: 0 | Status: DISCONTINUED | OUTPATIENT
Start: 2024-08-16 | End: 2024-08-19

## 2024-08-15 RX ORDER — ALVIMOPAN 12 MG/1
12 CAPSULE ORAL ONCE
Refills: 0 | Status: COMPLETED | OUTPATIENT
Start: 2024-08-15 | End: 2024-08-15

## 2024-08-15 RX ORDER — OXYCODONE HYDROCHLORIDE 5 MG/1
5 TABLET ORAL EVERY 4 HOURS
Refills: 0 | Status: DISCONTINUED | OUTPATIENT
Start: 2024-08-15 | End: 2024-08-16

## 2024-08-15 RX ORDER — KETOROLAC TROMETHAMINE 30 MG/ML
15 INJECTION, SOLUTION INTRAMUSCULAR EVERY 6 HOURS
Refills: 0 | Status: DISCONTINUED | OUTPATIENT
Start: 2024-08-15 | End: 2024-08-16

## 2024-08-15 RX ORDER — LOSARTAN POTASSIUM 50 MG/1
50 TABLET ORAL DAILY
Refills: 0 | Status: DISCONTINUED | OUTPATIENT
Start: 2024-08-16 | End: 2024-08-18

## 2024-08-15 RX ORDER — ALVIMOPAN 12 MG/1
12 CAPSULE ORAL
Refills: 0 | Status: DISCONTINUED | OUTPATIENT
Start: 2024-08-16 | End: 2024-08-19

## 2024-08-15 RX ORDER — HEPARIN SODIUM,BOVINE 1000/ML
5000 VIAL (ML) INJECTION ONCE
Refills: 0 | Status: COMPLETED | OUTPATIENT
Start: 2024-08-15 | End: 2024-08-15

## 2024-08-15 RX ORDER — LOSARTAN POTASSIUM 50 MG/1
50 TABLET ORAL DAILY
Refills: 0 | Status: DISCONTINUED | OUTPATIENT
Start: 2024-08-15 | End: 2024-08-15

## 2024-08-15 RX ORDER — GLUCAGON INJECTION, SOLUTION 1 MG/.2ML
1 INJECTION, SOLUTION SUBCUTANEOUS ONCE
Refills: 0 | Status: DISCONTINUED | OUTPATIENT
Start: 2024-08-15 | End: 2024-08-19

## 2024-08-15 RX ORDER — ACETAMINOPHEN 325 MG/1
650 TABLET ORAL EVERY 6 HOURS
Refills: 0 | Status: DISCONTINUED | OUTPATIENT
Start: 2024-08-15 | End: 2024-08-17

## 2024-08-15 RX ORDER — ONDANSETRON 2 MG/ML
4 INJECTION, SOLUTION INTRAMUSCULAR; INTRAVENOUS ONCE
Refills: 0 | Status: COMPLETED | OUTPATIENT
Start: 2024-08-15 | End: 2024-08-17

## 2024-08-15 RX ORDER — HYDROMORPHONE HYDROCHLORIDE 2 MG/1
0.5 TABLET ORAL
Refills: 0 | Status: DISCONTINUED | OUTPATIENT
Start: 2024-08-15 | End: 2024-08-15

## 2024-08-15 RX ORDER — DEXTROSE 15 G/33 G
15 GEL IN PACKET (GRAM) ORAL ONCE
Refills: 0 | Status: DISCONTINUED | OUTPATIENT
Start: 2024-08-15 | End: 2024-08-19

## 2024-08-15 RX ORDER — HYDROMORPHONE HYDROCHLORIDE 2 MG/1
1 TABLET ORAL EVERY 4 HOURS
Refills: 0 | Status: DISCONTINUED | OUTPATIENT
Start: 2024-08-15 | End: 2024-08-17

## 2024-08-15 RX ORDER — OXYCODONE HYDROCHLORIDE 5 MG/1
10 TABLET ORAL EVERY 4 HOURS
Refills: 0 | Status: DISCONTINUED | OUTPATIENT
Start: 2024-08-15 | End: 2024-08-17

## 2024-08-15 RX ORDER — DEXTROSE 15 G/33 G
12.5 GEL IN PACKET (GRAM) ORAL ONCE
Refills: 0 | Status: DISCONTINUED | OUTPATIENT
Start: 2024-08-15 | End: 2024-08-19

## 2024-08-15 RX ORDER — ACETAMINOPHEN 325 MG/1
1000 TABLET ORAL ONCE
Refills: 0 | Status: COMPLETED | OUTPATIENT
Start: 2024-08-15 | End: 2024-08-15

## 2024-08-15 RX ORDER — ONDANSETRON 2 MG/ML
4 INJECTION, SOLUTION INTRAMUSCULAR; INTRAVENOUS ONCE
Refills: 0 | Status: DISCONTINUED | OUTPATIENT
Start: 2024-08-15 | End: 2024-08-15

## 2024-08-15 RX ORDER — AMLODIPINE BESYLATE 10 MG/1
10 TABLET ORAL DAILY
Refills: 0 | Status: DISCONTINUED | OUTPATIENT
Start: 2024-08-16 | End: 2024-08-19

## 2024-08-15 RX ADMIN — ACETAMINOPHEN 1000 MILLIGRAM(S): 325 TABLET ORAL at 19:31

## 2024-08-15 RX ADMIN — Medication 5000 UNIT(S): at 06:50

## 2024-08-15 RX ADMIN — ALVIMOPAN 12 MILLIGRAM(S): 12 CAPSULE ORAL at 06:50

## 2024-08-15 RX ADMIN — ACETAMINOPHEN 400 MILLIGRAM(S): 325 TABLET ORAL at 19:16

## 2024-08-15 RX ADMIN — Medication 40 MILLIGRAM(S): at 20:58

## 2024-08-15 RX ADMIN — KETOROLAC TROMETHAMINE 15 MILLIGRAM(S): 30 INJECTION, SOLUTION INTRAMUSCULAR at 18:44

## 2024-08-15 RX ADMIN — Medication 1: at 18:04

## 2024-08-15 RX ADMIN — CEFOTETAN 100 GRAM(S): 2 INJECTION, POWDER, FOR SOLUTION INTRAMUSCULAR; INTRAVENOUS at 20:30

## 2024-08-15 RX ADMIN — KETOROLAC TROMETHAMINE 15 MILLIGRAM(S): 30 INJECTION, SOLUTION INTRAMUSCULAR at 19:00

## 2024-08-15 RX ADMIN — Medication 50 MILLILITER(S): at 07:04

## 2024-08-15 NOTE — PROGRESS NOTE ADULT - ASSESSMENT
63y/o male with medical h/o HTN, HDL, and T2DM, reports Malignant neoplasm rectum discovered on colonoscopy evaluation. Pt now s/p scheduled Colectomy Partial w/Anastomosis, Cystourethroscopy.      Plan:   #S/p laparotomy exploratory, with Kaylene procedure  #T2DM  #HTN    Neuro:  -Pain regimen per surgery     Pulm:  - Keep O2 sat >92%    CV:   -Cw home amlodipine 15mg qd   -Resume home losartan 50mg qd, and lffjbmsqgpuvutfohmq85.5mg qd when able   -F/up cbc and cmp am  -Cw home atorvastatin 40mg    GI:   -Ostomy bag in place  -Clear liquid diet, advance as tolerated   -Bowel regimen per surgery    ID:   -Monitor off abx    Renal:  -IV fluids  -I and O    Endo:  -Hold home metformin 500mg qd while inpt  -Cw ISS  -Can consider adding basal insulin if BS persistently >180    DVT ppx:   -SCDs, to start lovenox tomorrow

## 2024-08-15 NOTE — CONSULT NOTE ADULT - SUBJECTIVE AND OBJECTIVE BOX
Pt is a 65y/o male with medical h/o HTN, HDL, and T2DM, reports Malignant neoplasm rectum discovered on colonoscopy evaluation. Pt now s/p scheduled Colectomy Partial w/Anastomosis, Cystourethroscopy.    Medicine team consulted for post op orders for a1c 6.3.    Patient states     REVIEW OF SYSTEMS:  CONSTITUTIONAL: (-) weakness, (-) fevers, (-) chills  EYES/ENT: (-) visual changes,  (-) vertigo,  (-) throat pain   NECK:  (-) pain, (-) stiffness  RESPIRATORY:  (-) shortness of breath, (-) cough,  (-) wheezing,  (-) hemoptysis   CARDIOVASCULAR:  (-) chest pain, (-) palpitations  GASTROINTESTINAL:  (-) abdominal or epigastric pain, (-) nausea, (-) vomiting, (-) diarrhea, (-) constipation, (-) melena,  (-) hematemesis,  (-) hematochezia  GENITOURINARY: (-) dysuria, (-) frequency, (-) hematuria  NEUROLOGICAL: (-) numbness, (-) weakness  SKIN: (-) itching, (-) rashes, (-) lesions    PHYSICAL EXAM:  Vital Signs Last 24 Hrs  T(C): 36.2 (15 Aug 2024 16:15), Max: 36.4 (15 Aug 2024 05:03)  T(F): 97.2 (15 Aug 2024 16:15), Max: 97.6 (15 Aug 2024 05:03)  HR: 62 (15 Aug 2024 16:15) (62 - 88)  BP: 130/74 (15 Aug 2024 16:15) (126/74 - 136/74)  RR: 17 (15 Aug 2024 16:15) (16 - 24)  SpO2: 99% (15 Aug 2024 16:15) (96% - 99%)    Parameters below as of 15 Aug 2024 13:47  Patient On (Oxygen Delivery Method): nasal cannula  O2 Flow (L/min): 4    PHYSICAL EXAM:  GENERAL: NAD, lying in bed comfortably  HEAD:  Atraumatic, Normocephalic  RESP: Clear to auscultation bilaterally, good air entry bilaterally; No wheezing, rales, or rhonchi. Unlabored respirations  CARDIAC: Regular rate and rhythm. S1 and S2. No murmurs, rubs, or gallops  GI:  Soft, Nontender, Nondistended. Bowel sounds present x4 quadrants; No hepatomegaly. No splenomegaly.  EXTREMITIES:  2+ Peripheral Pulses. Capillary refill <2 seconds. No clubbing, cyanosis, or edema  NERVOUS SYSTEM:  Alert & Oriented X3, speech clear. No deficits   MSK: FROM all 4 extremities, full and equal strength  SKIN: No rashes, bruises, or other lesions         Pt is a 63y/o male with medical h/o HTN, HDL, and T2DM, reports Malignant neoplasm rectum discovered on colonoscopy evaluation. Pt now s/p scheduled Colectomy Partial w/Anastomosis, Cystourethroscopy.    Medicine team consulted for co-management post op.    Patient states that he is feeling ok, knows that he had to have the procedure done and happy it is over.    REVIEW OF SYSTEMS:  CONSTITUTIONAL: (-) weakness, (-) fevers, (-) chills  EYES/ENT: (-) visual changes,  (-) vertigo,  (-) throat pain   NECK:  (-) pain, (-) stiffness  RESPIRATORY:  (-) shortness of breath, (-) cough,  (-) wheezing,  (-) hemoptysis   CARDIOVASCULAR:  (-) chest pain, (-) palpitations  GASTROINTESTINAL:  (+) abdominal or epigastric pain, (-) nausea, (-) vomiting, (-) diarrhea, (-) constipation, (-) melena,  (-) hematemesis,  (-) hematochezia  GENITOURINARY: (-) dysuria, (-) frequency, (-) hematuria  NEUROLOGICAL: (-) numbness, (-) weakness  SKIN: (-) itching, (-) rashes, (-) lesions    PHYSICAL EXAM:  Vital Signs Last 24 Hrs  T(C): 36.2 (15 Aug 2024 16:15), Max: 36.4 (15 Aug 2024 05:03)  T(F): 97.2 (15 Aug 2024 16:15), Max: 97.6 (15 Aug 2024 05:03)  HR: 62 (15 Aug 2024 16:15) (62 - 88)  BP: 130/74 (15 Aug 2024 16:15) (126/74 - 136/74)  RR: 17 (15 Aug 2024 16:15) (16 - 24)  SpO2: 99% (15 Aug 2024 16:15) (96% - 99%)    Parameters below as of 15 Aug 2024 13:47  Patient On (Oxygen Delivery Method): nasal cannula  O2 Flow (L/min): 4    PHYSICAL EXAM:  GENERAL: NAD, lying in bed comfortably  HEAD:  Atraumatic, Normocephalic  RESP: Clear to auscultation bilaterally, good air entry bilaterally; No wheezing, rales, or rhonchi. Unlabored respirations  CARDIAC: Regular rate and rhythm. S1 and S2. No murmurs, rubs, or gallops  GI: Ostomy present, serosanguinous liquid present in bag  EXTREMITIES:  2+ Peripheral Pulses. Capillary refill <2 seconds. No clubbing, cyanosis, or edema  NERVOUS SYSTEM:  Alert & Oriented X3, speech clear. No deficits   MSK: FROM all 4 extremities, full and equal strength  SKIN: No rashes, bruises, or other lesions      CAPILLARY BLOOD GLUCOSE    POCT Blood Glucose.: 185 mg/dL (15 Aug 2024 17:08)  POCT Blood Glucose.: 171 mg/dL (15 Aug 2024 13:45)  POCT Blood Glucose.: 177 mg/dL (15 Aug 2024 12:46)  POCT Blood Glucose.: 192 mg/dL (15 Aug 2024 06:59)

## 2024-08-15 NOTE — BRIEF OPERATIVE NOTE - FIRST ASSIST LEVEL OF PARTICIPATION
Full Procedure
Full Procedure
Excisional Biopsy Additional Text (Leave Blank If You Do Not Want): The margin was drawn around the clinically apparent lesion. An elliptical shape was then drawn on the skin incorporating the lesion and margins.  Incisions were then made along these lines to the appropriate tissue plane and the lesion was extirpated.

## 2024-08-15 NOTE — BRIEF OPERATIVE NOTE - FIRST ASSIST CATEGORY
No qualified resident/fellow available to assist
Jason
No qualified resident/fellow available to assist

## 2024-08-15 NOTE — BRIEF OPERATIVE NOTE - NSICDXBRIEFPROCEDURE_GEN_ALL_CORE_FT
PROCEDURES:  Laparotomy, exploratory, with Kaylene procedure 15-Aug-2024 14:13:04 sigmoid resection Herminia Hammer  Cystoscopy, with ureteral stent insertion 15-Aug-2024 14:14:19  Herminia Hammer  
PROCEDURES:  Laparotomy, exploratory, with Kaylene procedure 15-Aug-2024 14:13:04 sigmoid resection Herminia Hammer  Cystoscopy, with ureteral stent insertion 15-Aug-2024 14:14:19  Herminia Hammer

## 2024-08-15 NOTE — CONSULT NOTE ADULT - ASSESSMENT
Pt is a 65y/o male with medical h/o HTN, HDL, and T2DM, reports Malignant neoplasm rectum discovered on colonoscopy evaluation now s/p scheduled Colectomy Partial w/Anastomosis, Cystourethroscopy.    #T2DM  -A1c 6.3  -Well controlled  -Hold home metformin 500mg qd  -Cw ISS  -Can consider adding basal insulin if BS persistently >180  -Resume metformin on discharge    #HTN  -Cw home amlodipine, losartan, hydrochlorothiazide    HLD  -Cw home atorvastatin 40mg    Case dw Dr. Moore Pt is a 65y/o male with medical h/o HTN, HDL, and T2DM, reports Malignant neoplasm rectum discovered on colonoscopy evaluation now s/p scheduled Colectomy Partial w/Anastomosis, Cystourethroscopy.    Medicine consulted for co-management.     #S/p laparotomy exploratory, with Kaylene procedure  -Ostomy bag in place  -Check cbc and cmp now  -IV fluids  -Is and Os  -Clear liquid diet, advance as tolerated   -Pain regimen per surgery   -Bowel regimen per surgery  -F/up cbc and cmp am    #T2DM  -A1c 6.3  -Well controlled  -Hold home metformin 500mg qd  -Cw ISS  -Can consider adding basal insulin if BS persistently >180  -Resume metformin on discharge    #HTN  -Cw home amlodipine 15mg qd   -Resume home losartan 50mg qd tomorrow   -Resume home nyffzpubfgzygrdnlca35.5mg qd tomorrow     #HLD  -Cw home atorvastatin 40mg    Case dw Dr. Moore Pt is a 63y/o male with medical h/o HTN, HDL, and T2DM, reports Malignant neoplasm rectum discovered on colonoscopy evaluation now s/p scheduled Colectomy Partial w/Anastomosis, Cystourethroscopy.    Medicine consulted for co-management.     #S/p laparotomy exploratory, with Kaylene procedure  -Ostomy bag in place  -Check cbc and cmp now  -IV fluids  -Is and Os  -Clear liquid diet, advance as tolerated   -Pain regimen per surgery   -Bowel regimen per surgery  -F/up cbc and cmp am    #T2DM  -A1c 6.3  -Well controlled  -Hold home metformin 500mg qd  -Cw ISS  -Can consider adding basal insulin if BS persistently >180  -Resume metformin on discharge    #HTN  -Cw home amlodipine 15mg qd   -Resume home losartan 50mg qd tomorrow   -Resume home alqivzszxidemsihjzk90.5mg qd tomorrow     #HLD  -Cw home atorvastatin 40mg    DVT ppx: SCDs, to start lovenox tomorrow    Case dw Dr. Moore

## 2024-08-15 NOTE — BRIEF OPERATIVE NOTE - NSICDXBRIEFPREOP_GEN_ALL_CORE_FT
PRE-OP DIAGNOSIS:  Colon cancer 15-Aug-2024 14:07:21  Herminia Hammer  
PRE-OP DIAGNOSIS:  Colon cancer 15-Aug-2024 14:07:21  Herminia Hammer

## 2024-08-15 NOTE — BRIEF OPERATIVE NOTE - NSICDXBRIEFPOSTOP_GEN_ALL_CORE_FT
POST-OP DIAGNOSIS:  Colon cancer 15-Aug-2024 14:07:38  Herminia Hammer  
POST-OP DIAGNOSIS:  Colon cancer 15-Aug-2024 14:07:38  Herminia Hammer

## 2024-08-15 NOTE — CONSULT NOTE ADULT - ATTENDING COMMENTS
63 y/o M with PMH HTN, HLD, and Type 2 DM, malignant rectal neoplasm discovered on colonoscopy evaluation, admitted under surgery s/p for exploratory laparotomy with Kaylene procedure, sigmoid resection, and cystourethroscopy with ureteral stent insertion 8/15. FM consulted for medical co management. Patient seen and examined at bedside with family, feeling well. Pain currently controlled. Emotional support provided as patient uneasy with new ostomy. Home meds reviewed - T2DM - hold metformin while inpatient, resume on discharge, start FS, ISS, CLD per surgery. HTN - continue home amlodipine 15mg daily. Resume losartan 50mg daily, HCTZ 12.5mg daily 8/16 pending VS. HLD - continue home lipitor. Continue IVF. Follow AM labs. DVT ppx

## 2024-08-15 NOTE — PROGRESS NOTE ADULT - SUBJECTIVE AND OBJECTIVE BOX
Critical care note: Post op note    Reason for admission: removing colon cancer       BRIEF HOSPITAL COURSE: 65y/o male with medical h/o HTN, HDL, and T2DM, reports Malignant neoplasm rectum discovered on colonoscopy evaluation. Pt now s/p scheduled Colectomy Partial w/Anastomosis, Cystourethroscopy.      Events last 24 hours:  POD#0  S/P Lap converted to open Laparotomy, exploratory, with Kaylene procedure and Cystoscopy, with ureteral stent insertion  Post op VSS  pain managed with PRN        PAST MEDICAL & SURGICAL HISTORY:  Hypertension      Hyperlipidemia      Type 2 diabetes mellitus      Skin tag  right groin      Hydrocele  left      Colon cancer      S/P repair of hydrocele          Review of Systems:  CONSTITUTIONAL: No fever, chills, or fatigue  EYES: No eye pain, visual disturbances, or discharge  ENMT:  No difficulty hearing, tinnitus, vertigo; No sinus or throat pain  NECK: No pain or stiffness  RESPIRATORY: No cough, wheezing, chills or hemoptysis; No shortness of breath  CARDIOVASCULAR: No chest pain, palpitations, dizziness, or leg swelling  GASTROINTESTINAL: No abdominal or epigastric pain. No nausea, vomiting, or hematemesis; No diarrhea or constipation. No melena or hematochezia.  GENITOURINARY: No dysuria, frequency, hematuria, or incontinence  NEUROLOGICAL: No headaches, memory loss, loss of strength, numbness, or tremors  SKIN: No itching, burning, rashes, or lesions   MUSCULOSKELETAL: No joint pain or swelling; No muscle, back, or extremity pain  PSYCHIATRIC: No depression, anxiety, mood swings, or difficulty sleeping      Medications:    amLODIPine   Tablet 10 milliGRAM(s) Oral daily  hydrochlorothiazide 12.5 milliGRAM(s) Oral daily  losartan 50 milliGRAM(s) Oral daily      acetaminophen     Tablet .. 650 milliGRAM(s) Oral every 6 hours PRN  HYDROmorphone  Injectable 1 milliGRAM(s) IV Push every 4 hours PRN  ketorolac   Injectable 15 milliGRAM(s) IV Push every 6 hours  ondansetron Injectable 4 milliGRAM(s) IV Push once  oxyCODONE    IR 10 milliGRAM(s) Oral every 4 hours PRN  oxyCODONE    IR 5 milliGRAM(s) Oral every 4 hours PRN      enoxaparin Injectable 40 milliGRAM(s) SubCutaneous every 24 hours    alvimopan 12 milliGRAM(s) Oral two times a day      atorvastatin 40 milliGRAM(s) Oral at bedtime  dextrose 50% Injectable 25 Gram(s) IV Push once  dextrose 50% Injectable 25 Gram(s) IV Push once  dextrose 50% Injectable 12.5 Gram(s) IV Push once  dextrose 50% Injectable 25 Gram(s) IV Push once  dextrose Oral Gel 15 Gram(s) Oral once PRN  glucagon  Injectable 1 milliGRAM(s) IntraMuscular once  glucagon  Injectable 1 milliGRAM(s) IntraMuscular once  insulin lispro (ADMELOG) corrective regimen sliding scale   SubCutaneous three times a day before meals    dextrose 5%. 1000 milliLiter(s) IV Continuous <Continuous>  dextrose 5%. 1000 milliLiter(s) IV Continuous <Continuous>  dextrose 5%. 1000 milliLiter(s) IV Continuous <Continuous>  dextrose 5%. 1000 milliLiter(s) IV Continuous <Continuous>  lactated ringers. 1000 milliLiter(s) IV Continuous <Continuous>                ICU Vital Signs Last 24 Hrs  T(C): 36.4 (15 Aug 2024 20:53), Max: 36.9 (15 Aug 2024 16:44)  T(F): 97.6 (15 Aug 2024 20:53), Max: 98.4 (15 Aug 2024 16:44)  HR: 64 (15 Aug 2024 20:53) (62 - 88)  BP: 129/69 (15 Aug 2024 20:53) (125/74 - 136/74)  BP(mean): --  ABP: --  ABP(mean): --  RR: 18 (15 Aug 2024 20:53) (16 - 24)  SpO2: 98% (15 Aug 2024 20:53) (96% - 99%)    O2 Parameters below as of 15 Aug 2024 20:53  Patient On (Oxygen Delivery Method): room air                I&O's Detail    15 Aug 2024 07:01  -  16 Aug 2024 01:47  --------------------------------------------------------  IN:    Lactated Ringers: 200 mL    Lactated Ringers: 200 mL  Total IN: 400 mL    OUT:    Colostomy (mL): 0 mL    Indwelling Catheter - Urethral (mL): 390 mL  Total OUT: 390 mL    Total NET: 10 mL            LABS:                        11.1   11.84 )-----------( 290      ( 15 Aug 2024 18:25 )             33.4     08-15    140  |  102  |  8   ----------------------------<  162<H>  3.8   |  28  |  1.15    Ca    8.6      15 Aug 2024 18:25    TPro  6.6  /  Alb  3.4  /  TBili  0.7  /  DBili  x   /  AST  16  /  ALT  19  /  AlkPhos  95  08-15          CAPILLARY BLOOD GLUCOSE      POCT Blood Glucose.: 185 mg/dL (15 Aug 2024 17:08)      Urinalysis Basic - ( 15 Aug 2024 18:25 )    Color: x / Appearance: x / SG: x / pH: x  Gluc: 162 mg/dL / Ketone: x  / Bili: x / Urobili: x   Blood: x / Protein: x / Nitrite: x   Leuk Esterase: x / RBC: x / WBC x   Sq Epi: x / Non Sq Epi: x / Bacteria: x      CULTURES:  Culture Results:   No growth (08-13-24 @ 09:37)          Physical Examination:    General: No acute distress. Alert, oriented, interactive, nonfocal    HEENT: Pupils equal, reactive to light.  Symmetric.    PULM: Clear to auscultation bilaterally, no significant sputum production    CVS: Regular rate and rhythm, no murmurs, rubs, or gallops    ABD: Soft, nondistended, nontender, normoactive bowel sounds, no masses, ostomy patent     EXT: No edema, nontender    SKIN: Warm and well perfused, no rashes noted.    RADIOLOGY: ***    CRITICAL CARE TIME SPENT:   Time spent on this patient encounter, which includes documenting this note in the electronic medical record, was 41 minutes including assessing the presenting problems with associated risks, reviewing the medical record to prepare for the encounter, and meeting face to face with patient to obtain additional history. I have also performed an appropriate physical exam, made interventions listed and ordered and interpreted appropriate diagnostic studies as documented. To improve communication and patient saftey, I have coordinated care with the multidisciplinary team including the bedside nurse, appropriate attending of record and consultants as needed.

## 2024-08-16 ENCOUNTER — TRANSCRIPTION ENCOUNTER (OUTPATIENT)
Age: 64
End: 2024-08-16

## 2024-08-16 LAB
ANION GAP SERPL CALC-SCNC: 5 MMOL/L — SIGNIFICANT CHANGE UP (ref 5–17)
BUN SERPL-MCNC: 6 MG/DL — LOW (ref 7–23)
CALCIUM SERPL-MCNC: 8.6 MG/DL — SIGNIFICANT CHANGE UP (ref 8.4–10.5)
CHLORIDE SERPL-SCNC: 105 MMOL/L — SIGNIFICANT CHANGE UP (ref 96–108)
CO2 SERPL-SCNC: 33 MMOL/L — HIGH (ref 22–31)
CREAT SERPL-MCNC: 1.14 MG/DL — SIGNIFICANT CHANGE UP (ref 0.5–1.3)
EGFR: 71 ML/MIN/1.73M2 — SIGNIFICANT CHANGE UP
GLUCOSE BLDC GLUCOMTR-MCNC: 113 MG/DL — HIGH (ref 70–99)
GLUCOSE BLDC GLUCOMTR-MCNC: 134 MG/DL — HIGH (ref 70–99)
GLUCOSE BLDC GLUCOMTR-MCNC: 144 MG/DL — HIGH (ref 70–99)
GLUCOSE BLDC GLUCOMTR-MCNC: 148 MG/DL — HIGH (ref 70–99)
GLUCOSE SERPL-MCNC: 107 MG/DL — HIGH (ref 70–99)
HCT VFR BLD CALC: 31.6 % — LOW (ref 39–50)
HGB BLD-MCNC: 10.5 G/DL — LOW (ref 13–17)
MCHC RBC-ENTMCNC: 28.2 PG — SIGNIFICANT CHANGE UP (ref 27–34)
MCHC RBC-ENTMCNC: 33.2 GM/DL — SIGNIFICANT CHANGE UP (ref 32–36)
MCV RBC AUTO: 84.7 FL — SIGNIFICANT CHANGE UP (ref 80–100)
NRBC # BLD: 0 /100 WBCS — SIGNIFICANT CHANGE UP (ref 0–0)
PLATELET # BLD AUTO: 296 K/UL — SIGNIFICANT CHANGE UP (ref 150–400)
POTASSIUM SERPL-MCNC: 3.5 MMOL/L — SIGNIFICANT CHANGE UP (ref 3.5–5.3)
POTASSIUM SERPL-SCNC: 3.5 MMOL/L — SIGNIFICANT CHANGE UP (ref 3.5–5.3)
RBC # BLD: 3.73 M/UL — LOW (ref 4.2–5.8)
RBC # FLD: 13.3 % — SIGNIFICANT CHANGE UP (ref 10.3–14.5)
SODIUM SERPL-SCNC: 143 MMOL/L — SIGNIFICANT CHANGE UP (ref 135–145)
WBC # BLD: 7.83 K/UL — SIGNIFICANT CHANGE UP (ref 3.8–10.5)
WBC # FLD AUTO: 7.83 K/UL — SIGNIFICANT CHANGE UP (ref 3.8–10.5)

## 2024-08-16 PROCEDURE — 99233 SBSQ HOSP IP/OBS HIGH 50: CPT

## 2024-08-16 RX ADMIN — KETOROLAC TROMETHAMINE 15 MILLIGRAM(S): 30 INJECTION, SOLUTION INTRAMUSCULAR at 12:56

## 2024-08-16 RX ADMIN — Medication 40 MILLIGRAM(S): at 21:44

## 2024-08-16 RX ADMIN — Medication 100 MILLILITER(S): at 06:30

## 2024-08-16 RX ADMIN — HYDROMORPHONE HYDROCHLORIDE 1 MILLIGRAM(S): 2 TABLET ORAL at 12:18

## 2024-08-16 RX ADMIN — AMLODIPINE BESYLATE 10 MILLIGRAM(S): 10 TABLET ORAL at 05:42

## 2024-08-16 RX ADMIN — HYDROMORPHONE HYDROCHLORIDE 1 MILLIGRAM(S): 2 TABLET ORAL at 06:42

## 2024-08-16 RX ADMIN — HYDROMORPHONE HYDROCHLORIDE 1 MILLIGRAM(S): 2 TABLET ORAL at 06:30

## 2024-08-16 RX ADMIN — ALVIMOPAN 12 MILLIGRAM(S): 12 CAPSULE ORAL at 17:35

## 2024-08-16 RX ADMIN — ENOXAPARIN SODIUM 40 MILLIGRAM(S): 100 INJECTION SUBCUTANEOUS at 05:42

## 2024-08-16 RX ADMIN — KETOROLAC TROMETHAMINE 15 MILLIGRAM(S): 30 INJECTION, SOLUTION INTRAMUSCULAR at 23:27

## 2024-08-16 RX ADMIN — HYDROMORPHONE HYDROCHLORIDE 1 MILLIGRAM(S): 2 TABLET ORAL at 12:47

## 2024-08-16 RX ADMIN — KETOROLAC TROMETHAMINE 15 MILLIGRAM(S): 30 INJECTION, SOLUTION INTRAMUSCULAR at 05:42

## 2024-08-16 RX ADMIN — KETOROLAC TROMETHAMINE 15 MILLIGRAM(S): 30 INJECTION, SOLUTION INTRAMUSCULAR at 06:42

## 2024-08-16 RX ADMIN — KETOROLAC TROMETHAMINE 15 MILLIGRAM(S): 30 INJECTION, SOLUTION INTRAMUSCULAR at 17:35

## 2024-08-16 RX ADMIN — LOSARTAN POTASSIUM 50 MILLIGRAM(S): 50 TABLET ORAL at 05:41

## 2024-08-16 RX ADMIN — ALVIMOPAN 12 MILLIGRAM(S): 12 CAPSULE ORAL at 05:41

## 2024-08-16 NOTE — DISCHARGE NOTE PROVIDER - NSDCCPCAREPLAN_GEN_ALL_CORE_FT
PRINCIPAL DISCHARGE DIAGNOSIS  Diagnosis: Malignant neoplasm of rectum  Assessment and Plan of Treatment:

## 2024-08-16 NOTE — DISCHARGE NOTE PROVIDER - NSDCMRMEDTOKEN_GEN_ALL_CORE_FT
amLODIPine: 15 milligram(s) orally once a day  atorvastatin 40 mg oral tablet: 1 tab(s) orally once a day (at bedtime)  losartan-hydrochlorothiazide 50mg-12.5mg oral tablet: 1 tab(s) orally once a day  metFORMIN 500 mg oral tablet: 1  orally once a day

## 2024-08-16 NOTE — DIETITIAN INITIAL EVALUATION ADULT - NSICDXPASTMEDICALHX_GEN_ALL_CORE_FT
PAST MEDICAL HISTORY:  Colon cancer     Hydrocele left    Hyperlipidemia     Hypertension     Skin tag right groin    Type 2 diabetes mellitus

## 2024-08-16 NOTE — DISCHARGE NOTE PROVIDER - CARE PROVIDER_API CALL
Christian Whiting.  Surgery  [address] [address] [facility] [address], [address]  [place] [address]  Phone: [phone]  Fax: [phone]  Follow Up Time: 1 week

## 2024-08-16 NOTE — DIETITIAN INITIAL EVALUATION ADULT - PERTINENT LABORATORY DATA
08-16    143  |  105  |  6<L>  ----------------------------<  107<H>  3.5   |  33<H>  |  1.14    Ca    8.6      16 Aug 2024 05:43    TPro  6.6  /  Alb  3.4  /  TBili  0.7  /  DBili  x   /  AST  16  /  ALT  19  /  AlkPhos  95  08-15  POCT Blood Glucose.: 148 mg/dL (08-16-24 @ 08:53)  A1C with Estimated Average Glucose Result: 6.3 % (08-13-24 @ 08:45)

## 2024-08-16 NOTE — DIETITIAN INITIAL EVALUATION ADULT - PERTINENT MEDS FT
MEDICATIONS  (STANDING):  alvimopan 12 milliGRAM(s) Oral two times a day  amLODIPine   Tablet 10 milliGRAM(s) Oral daily  atorvastatin 40 milliGRAM(s) Oral at bedtime  dextrose 5%. 1000 milliLiter(s) (50 mL/Hr) IV Continuous <Continuous>  dextrose 5%. 1000 milliLiter(s) (100 mL/Hr) IV Continuous <Continuous>  dextrose 5%. 1000 milliLiter(s) (50 mL/Hr) IV Continuous <Continuous>  dextrose 5%. 1000 milliLiter(s) (100 mL/Hr) IV Continuous <Continuous>  dextrose 50% Injectable 25 Gram(s) IV Push once  dextrose 50% Injectable 25 Gram(s) IV Push once  dextrose 50% Injectable 25 Gram(s) IV Push once  dextrose 50% Injectable 12.5 Gram(s) IV Push once  enoxaparin Injectable 40 milliGRAM(s) SubCutaneous every 24 hours  glucagon  Injectable 1 milliGRAM(s) IntraMuscular once  glucagon  Injectable 1 milliGRAM(s) IntraMuscular once  hydrochlorothiazide 12.5 milliGRAM(s) Oral daily  insulin lispro (ADMELOG) corrective regimen sliding scale   SubCutaneous three times a day before meals  insulin lispro (ADMELOG) corrective regimen sliding scale   SubCutaneous at bedtime  ketorolac   Injectable 15 milliGRAM(s) IV Push every 6 hours  lactated ringers. 1000 milliLiter(s) (100 mL/Hr) IV Continuous <Continuous>  losartan 50 milliGRAM(s) Oral daily  ondansetron Injectable 4 milliGRAM(s) IV Push once    MEDICATIONS  (PRN):  acetaminophen     Tablet .. 650 milliGRAM(s) Oral every 6 hours PRN Mild Pain (1 - 3)  dextrose Oral Gel 15 Gram(s) Oral once PRN Blood Glucose LESS THAN 70 milliGRAM(s)/deciliter  HYDROmorphone  Injectable 1 milliGRAM(s) IV Push every 4 hours PRN Severe Pain (7 - 10)  oxyCODONE    IR 10 milliGRAM(s) Oral every 4 hours PRN Moderate Pain (4 - 6)

## 2024-08-16 NOTE — ADVANCED PRACTICE NURSE CONSULT - ASSESSMENT
Patient seen & examined at bedside, A&Ox4, verbal. Patient is Post op day #1, per chart review from Kaylene's/Sigmoid colostomy.  Patient currently with 2.25 inch two-piece drainable appliance, sero-sanguinous liquid in pouch.  Appliance is intact without leakage, did not remove, complete stoma & peristomal assessment not done.  As visualized through the pouch, stoma is red, moist & viable, with appropriate/expected congestion/edema.  Began education with patient, reviewed contents of [employer] educational folder, discussed anatomy of GI tract & expected stoma appearance.  Informed patient that stoma size will likely shrink in post-op weeks, and that frequent stoma measurement is required for up to 8 weeks to ensure proper fit of barrier.  Discussed that expected frequency of effluent is likely decrease & consistency likely to thicken with resumption of normal diet.   Reassured patient that having ostomy should not greatly impact lifestyle once cleared by surgeon for normal activity.  Demonstrated to patient attaching a sample pouch to sample wafer as well as opening & closing drainable pouch.  Patient was able to return demonstrate both using sample pouch.   Discussed frequency of pouch & wafer changes & expected 'insurance covered' amount of supplies per month.

## 2024-08-16 NOTE — ADVANCED PRACTICE NURSE CONSULT - RECOMMEDATIONS
Based on current stoma size, suggest changing to larger 2.75 inch appliance at next barrier change (Pouch #[id number], barrier #[id number]  Patient seems accepting & willing to learn, asked questions about care & answered to patient satisfaction.   No barriers to learning have been identified, patient demonstrated adequate manual dexterity & vision, is able to visualize to stoma.  Explained to patient that as an Ostomy Nurse I will remain available throughout admission, and that patient should receive a Home Care Ostomy Nurse to continue education & training.

## 2024-08-16 NOTE — DISCHARGE NOTE PROVIDER - NSDCCPTREATMENT_GEN_ALL_CORE_FT
PRINCIPAL PROCEDURE  Procedure: Laparotomy, exploratory, with Kaylene procedure  Findings and Treatment: sigmoid resection      SECONDARY PROCEDURE  Procedure: Cystoscopy, with ureteral stent insertion  Findings and Treatment:

## 2024-08-16 NOTE — DISCHARGE NOTE PROVIDER - NSDCACTIVITY_GEN_ALL_CORE
Showering allowed/Stairs allowed/Walking - Indoors allowed/No heavy lifting/straining/Walking - Outdoors allowed/Activity as tolerated Showering allowed/Stairs allowed/Walking - Indoors allowed/No heavy lifting/straining/Walking - Outdoors allowed/Follow Instructions Provided by your Surgical Team/Activity as tolerated

## 2024-08-16 NOTE — DISCHARGE NOTE PROVIDER - HOSPITAL COURSE
64M with PMHx of HTN, HDL, and T2DM presented to [facility] on 08/15 for scheduled procedure. Pt now s/p ex-lap w/ sigmoidectomy and Kaylene's procedure as well as cystoscopy w/ b/l ureteral stent placement with Shilpi Whiting and Gabino. One of the two ureteral stents was removed immediately post-op. Operative course uneventful, pt recovered in PACU and admitted to surgical floor for further monitoring. The second ureteral stent was removed on POD#1. Pt vitals and labs were monitored throughout stay. Pt was on DVT prophylaxis during hospital admission. Quiles was removed on POD#2 and passed TOV. Pts diet was slowly advanced to regular diet as tolerated. Pt OOB/ambulated. Pt was seen and examined by attending surgeon and determined stable for discharge. At time of discharge pt was HD stable, tolerating diet, voiding freely, exam stable. Pt understands and agreeable to plan.

## 2024-08-16 NOTE — DIETITIAN INITIAL EVALUATION ADULT - NSFNSGIIOFT_GEN_A_CORE
08-15-24 @ 07:01  -  08-16-24 @ 07:00  --------------------------------------------------------  OUT:    Colostomy (mL): 0 mL  Total OUT: 0 mL    Total NET: 0 mL

## 2024-08-16 NOTE — DISCHARGE NOTE PROVIDER - NSDCFUADDINST_GEN_ALL_CORE_FT
May shower. Do not scrub or submerge incision in water. Allow soapy water to run over incision and pat to dry with a clean towel. You have staples over your incision site, it will be removed at your follow up appointment in 1 week, do not pick at it. If there is any clear/blood tinged drainage from your incision site, you may cover the area with gauze and tape.   Follow up with Dr. Whiting in 1 week, call office to schedule appointment. May call physician sooner with any questions or concerns.    May take over the counter Tylenol 650-975mg every 6 hours alternating with Motrin 600mg every 6 hours as needed for pain.     You have an ostomy on your left abdomen. Empty the ostomy as needed.    Please notify MD sooner or return to ER with any new or worsening symptoms, uncontrollable pain, foul smelling discharge or drainage from wound, excessive bleeding or swelling, abdominal pain, nausea/vomiting, or other concerns/problems.

## 2024-08-16 NOTE — PROGRESS NOTE ADULT - SUBJECTIVE AND OBJECTIVE BOX
Pt is a 63y/o male with medical h/o HTN, HDL, and T2DM, reports Malignant neoplasm rectum discovered on colonoscopy evaluation. Pt now s/p scheduled Colectomy Partial w/Anastomosis, Cystourethroscopy.    Medicine team consulted for co-management post op.    Patient states that he is feeling well. He understands the follow up as per the surgeon. Pt denies any lightheadedness, dizziness, chest pain, or SOB. Pt tolerating clear liquid diet well.     REVIEW OF SYSTEMS:  CONSTITUTIONAL: (-) weakness, (-) fevers, (-) chills  EYES/ENT: (-) visual changes,  (-) vertigo,  (-) throat pain   NECK:  (-) pain, (-) stiffness  RESPIRATORY:  (-) shortness of breath, (-) cough,  (-) wheezing,  (-) hemoptysis   CARDIOVASCULAR:  (-) chest pain, (-) palpitations  GASTROINTESTINAL:  (+) abdominal or epigastric pain, (-) nausea, (-) vomiting, (-) diarrhea, (-) constipation, (-) melena,  (-) hematemesis,  (-) hematochezia  GENITOURINARY: (-) dysuria, (-) frequency, (-) hematuria  NEUROLOGICAL: (-) numbness, (-) weakness  SKIN: (-) itching, (-) rashes, (-) lesions    Vital Signs Last 24 Hrs  T(C): 36.7 (16 Aug 2024 08:56), Max: 36.9 (15 Aug 2024 16:44)  T(F): 98.1 (16 Aug 2024 08:56), Max: 98.4 (15 Aug 2024 16:44)  HR: 58 (16 Aug 2024 08:56) (58 - 88)  BP: 116/73 (16 Aug 2024 08:56) (116/73 - 136/74)  BP(mean): --  RR: 24 (16 Aug 2024 08:56) (16 - 24)  SpO2: 94% (16 Aug 2024 08:56) (94% - 99%)    Parameters below as of 16 Aug 2024 08:56  Patient On (Oxygen Delivery Method): room air    PHYSICAL EXAM:  GENERAL: NAD, lying in bed comfortably  HEAD:  Atraumatic, Normocephalic  RESP: Clear to auscultation bilaterally, good air entry bilaterally; No wheezing, rales, or rhonchi. Unlabored respirations  CARDIAC: Regular rate and rhythm. S1 and S2. No murmurs, rubs, or gallops  GI: Ostomy present, serosanguinous liquid present in bag  EXTREMITIES:  2+ Peripheral Pulses. Capillary refill <2 seconds. No clubbing, cyanosis, or edema  NERVOUS SYSTEM:  Alert & Oriented X3, speech clear. No deficits   MSK: FROM all 4 extremities, full and equal strength  SKIN: No rashes, bruises, or other lesions      CAPILLARY BLOOD GLUCOSE      POCT Blood Glucose.: 148 mg/dL (16 Aug 2024 08:53)  POCT Blood Glucose.: 185 mg/dL (15 Aug 2024 17:08)  POCT Blood Glucose.: 171 mg/dL (15 Aug 2024 13:45)  POCT Blood Glucose.: 177 mg/dL (15 Aug 2024 12:46)

## 2024-08-16 NOTE — PROGRESS NOTE ADULT - ASSESSMENT
Pt is a 63y/o male with medical h/o HTN, HDL, and T2DM, reports Malignant neoplasm rectum discovered on colonoscopy evaluation now s/p scheduled Colectomy Partial w/Anastomosis, Cystourethroscopy.    Medicine consulted for co-management.     #S/p laparotomy exploratory, with Kaylene procedure  -Ostomy bag in place  -IV fluids  -Is and Os  -Clear liquid diet, advance as tolerated   -Pain regimen per surgery   -Bowel regimen per surgery  -monitor CBC and CMP       #T2DM  -A1c 6.3  -Well controlled  -Hold home metformin 500mg qd  -Cw ISS  -Can consider adding basal insulin if BS persistently >180  -Resume metformin on discharge    #HTN  -Cw home amlodipine 15mg qd   -Resumed home losartan 50mg qd today  -Resumed home wmnmhkpmonwmjsytzdg10.5mg qd today  -monitor BP     #HLD  -Cw home atorvastatin 40mg    DVT ppx: SCDs Start lovenox today     Case dw Dr. Moore Pt is a 63y/o male with medical h/o HTN, HDL, and T2DM, reports Malignant neoplasm rectum discovered on colonoscopy evaluation now s/p scheduled Colectomy Partial w/Anastomosis, Cystourethroscopy.    Medicine consulted for co-management.     #S/p laparotomy exploratory, with Kaylene procedure  -Ostomy bag in place  -IV fluids  -Is and Os  -Clear liquid diet, advance as tolerated   -Pain regimen per surgery   -Bowel regimen per surgery  -monitor CBC and CMP       #T2DM  -A1c 6.3  -Well controlled  -Hold home metformin 500mg qd  -Cw ISS three times a day and at bedtime  -Can consider adding basal insulin if BS persistently >180  -Resume metformin on discharge    #HTN  -Cw home amlodipine 15mg qd   -Resumed home losartan 50mg qd today  -Resumed home xxasuyvrcekdnlouigs49.5mg qd today  -monitor BP     #HLD  -Cw home atorvastatin 40mg    DVT ppx: SCDs Start lovenox today     Case dw Dr. Moore Pt is a 65y/o male with medical h/o HTN, HDL, and T2DM, reports Malignant neoplasm rectum discovered on colonoscopy evaluation now s/p scheduled Colectomy Partial w/Anastomosis, Cystourethroscopy.    Medicine consulted for co-management.     #S/p laparotomy exploratory, with Kaylene procedure  -Ostomy bag in place  -IV fluids  -Is and Os  -Clear liquid diet, advance as tolerated   -Pain regimen per surgery   -Bowel regimen per surgery  -monitor CBC and CMP       #T2DM  -A1c 6.3  -Well controlled  -Hold home metformin 500mg qd  -Cw ISS three times a day and at bedtime  -Can consider adding basal insulin if BS persistently >180  -Resume metformin on discharge    #HTN  -Cw home amlodipine 15mg qd   -Resumed home losartan 50mg qd today  -Resumed home lqajulpanmlhsdqcdpx18.5mg qd today  -monitor BP     #HLD  -Cw home atorvastatin 40mg    DVT ppx: SCDs Start lovenox today     Case dw Dr. Ramon

## 2024-08-16 NOTE — PROGRESS NOTE ADULT - ASSESSMENT
POD 0 S/P Lap converted to open Laparotomy, exploratory, with Kaylene procedure and Cystoscopy, with ureteral stent insertion

## 2024-08-16 NOTE — DISCHARGE NOTE PROVIDER - NSDCFUSCHEDAPPT_GEN_ALL_CORE_FT
[facility] Physician [facility], [facility]  [facility]  Scheduled Appointment: 09/18/2024

## 2024-08-16 NOTE — PROGRESS NOTE ADULT - SUBJECTIVE AND OBJECTIVE BOX
POD#:0  S/P Lap converted to open Laparotomy, exploratory, with Kaylene procedure and Cystoscopy, with ureteral stent insertion  64yMale  Pt seen and examined at bedside. comfortable, denies n/v/cp/sob    Vital Signs Last 24 Hrs  T(C): 36.7 (16 Aug 2024 08:56), Max: 36.9 (15 Aug 2024 16:44)  T(F): 98.1 (16 Aug 2024 08:56), Max: 98.4 (15 Aug 2024 16:44)  HR: 58 (16 Aug 2024 08:56) (58 - 88)  BP: 116/73 (16 Aug 2024 08:56) (116/73 - 136/74)  BP(mean): --  RR: 24 (16 Aug 2024 08:56) (16 - 24)  SpO2: 94% (16 Aug 2024 08:56) (94% - 99%)    Parameters below as of 16 Aug 2024 08:56  Patient On (Oxygen Delivery Method): room air      I&O's Detail    15 Aug 2024 07:01  -  16 Aug 2024 07:00  --------------------------------------------------------  IN:    Lactated Ringers: 200 mL    Lactated Ringers: 200 mL  Total IN: 400 mL    OUT:    Colostomy (mL): 0 mL    Indwelling Catheter - Urethral (mL): 390 mL  Total OUT: 390 mL    Total NET: 10 mL          Physical Exam  General: AAOx3, No acute distress  Skin: No jaundice, no icterus  Adomen: soft, nondistended, no rebound tenderness, no guarding, no palpable masses, ostomy pink edema, no air or stool noted  midline incision packing.   : Normal external genitalia, ureter stent removed, Quiles  Extremities: non edematous, no calf pain bilaterally    Drains/Tubes:   VTE Prophylaxsis:    Labs:                        10.5   7.83  )-----------( 296      ( 16 Aug 2024 05:43 )             31.6     08-16    143  |  105  |  6<L>  ----------------------------<  107<H>  3.5   |  33<H>  |  1.14    Ca    8.6      16 Aug 2024 05:43    TPro  6.6  /  Alb  3.4  /  TBili  0.7  /  DBili  x   /  AST  16  /  ALT  19  /  AlkPhos  95  08-15            A/P: 64yMale s/p

## 2024-08-16 NOTE — PROGRESS NOTE ADULT - PROBLEM SELECTOR PLAN 1
OOB  Clear/IVF  monitor ostomy for bowel function  Ostomy nurse pending to see pt this morning   DVT/PE prophylaxis  ureter stent removed  will discuss with surgeon

## 2024-08-16 NOTE — DIETITIAN INITIAL EVALUATION ADULT - OTHER INFO
65y/o male with medical h/o HTN, HDL, and T2DM, reports Malignant neoplasm rectum discovered on colonoscopy , patient POD # 1 s/p laparotomy, exploratory, with Kaylene procedure. Patient's diet advanced to clear fluids tolerated well no n/v reported , surgical incision noted (+) colostomy . LR @ 100ml/hr infusing , no edema , Labs/ POCT  reviewed , corrective insulin regimen noted , no changes in weight reported , NKFA . suggest advance diet to Low  fiber , consistent carbohydrate as medically indicated ,

## 2024-08-17 LAB
ANION GAP SERPL CALC-SCNC: 6 MMOL/L — SIGNIFICANT CHANGE UP (ref 5–17)
BUN SERPL-MCNC: 4 MG/DL — LOW (ref 7–23)
CALCIUM SERPL-MCNC: 8.9 MG/DL — SIGNIFICANT CHANGE UP (ref 8.4–10.5)
CHLORIDE SERPL-SCNC: 103 MMOL/L — SIGNIFICANT CHANGE UP (ref 96–108)
CO2 SERPL-SCNC: 32 MMOL/L — HIGH (ref 22–31)
CREAT SERPL-MCNC: 1.04 MG/DL — SIGNIFICANT CHANGE UP (ref 0.5–1.3)
EGFR: 80 ML/MIN/1.73M2 — SIGNIFICANT CHANGE UP
GLUCOSE BLDC GLUCOMTR-MCNC: 138 MG/DL — HIGH (ref 70–99)
GLUCOSE BLDC GLUCOMTR-MCNC: 146 MG/DL — HIGH (ref 70–99)
GLUCOSE BLDC GLUCOMTR-MCNC: 150 MG/DL — HIGH (ref 70–99)
GLUCOSE BLDC GLUCOMTR-MCNC: 158 MG/DL — HIGH (ref 70–99)
GLUCOSE SERPL-MCNC: 130 MG/DL — HIGH (ref 70–99)
HCT VFR BLD CALC: 31.4 % — LOW (ref 39–50)
HGB BLD-MCNC: 10.4 G/DL — LOW (ref 13–17)
MAGNESIUM SERPL-MCNC: 1.8 MG/DL — SIGNIFICANT CHANGE UP (ref 1.6–2.6)
MCHC RBC-ENTMCNC: 28.1 PG — SIGNIFICANT CHANGE UP (ref 27–34)
MCHC RBC-ENTMCNC: 33.1 GM/DL — SIGNIFICANT CHANGE UP (ref 32–36)
MCV RBC AUTO: 84.9 FL — SIGNIFICANT CHANGE UP (ref 80–100)
NRBC # BLD: 0 /100 WBCS — SIGNIFICANT CHANGE UP (ref 0–0)
PLATELET # BLD AUTO: 294 K/UL — SIGNIFICANT CHANGE UP (ref 150–400)
POTASSIUM SERPL-MCNC: 3.5 MMOL/L — SIGNIFICANT CHANGE UP (ref 3.5–5.3)
POTASSIUM SERPL-SCNC: 3.5 MMOL/L — SIGNIFICANT CHANGE UP (ref 3.5–5.3)
RBC # BLD: 3.7 M/UL — LOW (ref 4.2–5.8)
RBC # FLD: 13.5 % — SIGNIFICANT CHANGE UP (ref 10.3–14.5)
SODIUM SERPL-SCNC: 141 MMOL/L — SIGNIFICANT CHANGE UP (ref 135–145)
WBC # BLD: 7.25 K/UL — SIGNIFICANT CHANGE UP (ref 3.8–10.5)
WBC # FLD AUTO: 7.25 K/UL — SIGNIFICANT CHANGE UP (ref 3.8–10.5)

## 2024-08-17 PROCEDURE — 99232 SBSQ HOSP IP/OBS MODERATE 35: CPT

## 2024-08-17 RX ORDER — OXYCODONE HYDROCHLORIDE 5 MG/1
10 TABLET ORAL EVERY 6 HOURS
Refills: 0 | Status: DISCONTINUED | OUTPATIENT
Start: 2024-08-17 | End: 2024-08-19

## 2024-08-17 RX ORDER — ACETAMINOPHEN 325 MG/1
975 TABLET ORAL EVERY 6 HOURS
Refills: 0 | Status: DISCONTINUED | OUTPATIENT
Start: 2024-08-17 | End: 2024-08-19

## 2024-08-17 RX ORDER — DEXTROSE, SODIUM ACETATE, POTASSIUM CHLORIDE, POTASSIUM PHOSPHATE, AND SODIUM CHLORIDE 5; .15; .13; .28; .091 G/100ML; G/100ML; G/100ML; G/100ML; G/100ML
1000 INJECTION, SOLUTION INTRAVENOUS
Refills: 0 | Status: DISCONTINUED | OUTPATIENT
Start: 2024-08-17 | End: 2024-08-18

## 2024-08-17 RX ORDER — OXYCODONE HYDROCHLORIDE 5 MG/1
5 TABLET ORAL EVERY 6 HOURS
Refills: 0 | Status: DISCONTINUED | OUTPATIENT
Start: 2024-08-17 | End: 2024-08-19

## 2024-08-17 RX ADMIN — DEXTROSE, SODIUM ACETATE, POTASSIUM CHLORIDE, POTASSIUM PHOSPHATE, AND SODIUM CHLORIDE 100 MILLILITER(S): 5; .15; .13; .28; .091 INJECTION, SOLUTION INTRAVENOUS at 21:34

## 2024-08-17 RX ADMIN — Medication 40 MILLIGRAM(S): at 21:33

## 2024-08-17 RX ADMIN — ENOXAPARIN SODIUM 40 MILLIGRAM(S): 100 INJECTION SUBCUTANEOUS at 05:48

## 2024-08-17 RX ADMIN — DEXTROSE, SODIUM ACETATE, POTASSIUM CHLORIDE, POTASSIUM PHOSPHATE, AND SODIUM CHLORIDE 100 MILLILITER(S): 5; .15; .13; .28; .091 INJECTION, SOLUTION INTRAVENOUS at 16:53

## 2024-08-17 RX ADMIN — ONDANSETRON 4 MILLIGRAM(S): 2 INJECTION, SOLUTION INTRAMUSCULAR; INTRAVENOUS at 11:13

## 2024-08-17 RX ADMIN — OXYCODONE HYDROCHLORIDE 10 MILLIGRAM(S): 5 TABLET ORAL at 16:53

## 2024-08-17 RX ADMIN — ALVIMOPAN 12 MILLIGRAM(S): 12 CAPSULE ORAL at 19:01

## 2024-08-17 RX ADMIN — ALVIMOPAN 12 MILLIGRAM(S): 12 CAPSULE ORAL at 05:52

## 2024-08-17 RX ADMIN — HYDROMORPHONE HYDROCHLORIDE 1 MILLIGRAM(S): 2 TABLET ORAL at 11:13

## 2024-08-17 RX ADMIN — AMLODIPINE BESYLATE 10 MILLIGRAM(S): 10 TABLET ORAL at 05:48

## 2024-08-17 RX ADMIN — LOSARTAN POTASSIUM 50 MILLIGRAM(S): 50 TABLET ORAL at 05:49

## 2024-08-17 RX ADMIN — HYDROMORPHONE HYDROCHLORIDE 1 MILLIGRAM(S): 2 TABLET ORAL at 12:06

## 2024-08-17 NOTE — PROGRESS NOTE ADULT - SUBJECTIVE AND OBJECTIVE BOX
POD #2    Remains afebrile with a normal WBC  Dressing dry and intact  Stoma healthy - no function yet  Pain well controlled  OOB to chair    PLAN: When colostomy begins to function, may begin a low fiber diet            OOB walking            Colostomy care teaching to begin            Arrangements to be made for home nursing/colostomy care

## 2024-08-17 NOTE — PROGRESS NOTE ADULT - SUBJECTIVE AND OBJECTIVE BOX
Pt is a 63y/o male with medical h/o HTN, HDL, and T2DM, reports Malignant neoplasm rectum discovered on colonoscopy evaluation. Pt now s/p scheduled Colectomy Partial w/Anastomosis, Cystourethroscopy.    Medicine team consulted for co-management post op.    Patient states that he is feeling well. He understands the follow up as per the surgeon. Pt denies any lightheadedness, dizziness, chest pain, or SOB. Pt tolerating clear liquid diet well. Pt preparing for ostomy care training as per surgery. No concerns at this time.     REVIEW OF SYSTEMS:  CONSTITUTIONAL: (-) weakness, (-) fevers, (-) chills  EYES/ENT: (-) visual changes,  (-) vertigo,  (-) throat pain   NECK:  (-) pain, (-) stiffness  RESPIRATORY:  (-) shortness of breath, (-) cough,  (-) wheezing,  (-) hemoptysis   CARDIOVASCULAR:  (-) chest pain, (-) palpitations  GASTROINTESTINAL:  (+) abdominal or epigastric pain, (-) nausea, (-) vomiting, (-) diarrhea, (-) constipation, (-) melena,  (-) hematemesis,  (-) hematochezia  GENITOURINARY: (-) dysuria, (-) frequency, (-) hematuria  NEUROLOGICAL: (-) numbness, (-) weakness  SKIN: (-) itching, (-) rashes, (-) lesions    Vital Signs Last 24 Hrs  T(C): 36.8 (17 Aug 2024 04:42), Max: 36.8 (17 Aug 2024 04:42)  T(F): 98.2 (17 Aug 2024 04:42), Max: 98.2 (17 Aug 2024 04:42)  HR: 77 (17 Aug 2024 04:42) (59 - 77)  BP: 134/82 (17 Aug 2024 04:42) (117/65 - 134/82)  BP(mean): --  RR: 15 (17 Aug 2024 04:42) (14 - 16)  SpO2: 93% (17 Aug 2024 04:42) (93% - 100%)    Parameters below as of 17 Aug 2024 04:42  Patient On (Oxygen Delivery Method): room air    PHYSICAL EXAM:  GENERAL: NAD, lying in bed comfortably  HEAD:  Atraumatic, Normocephalic  RESP: Clear to auscultation bilaterally, good air entry bilaterally; No wheezing, rales, or rhonchi. Unlabored respirations  CARDIAC: Regular rate and rhythm. S1 and S2. No murmurs, rubs, or gallops  GI: Ostomy present, serosanguinous liquid present in bag  EXTREMITIES:  2+ Peripheral Pulses. Capillary refill <2 seconds. No clubbing, cyanosis, or edema  NERVOUS SYSTEM:  Alert & Oriented X3, speech clear. No deficits   MSK: FROM all 4 extremities, full and equal strength  SKIN: No rashes, bruises, or other lesions      CAPILLARY BLOOD GLUCOSE      POCT Blood Glucose.: 138 mg/dL (17 Aug 2024 08:27)  POCT Blood Glucose.: 113 mg/dL (16 Aug 2024 21:52)  POCT Blood Glucose.: 144 mg/dL (16 Aug 2024 17:26)  POCT Blood Glucose.: 134 mg/dL (16 Aug 2024 12:52)

## 2024-08-17 NOTE — PROGRESS NOTE ADULT - ASSESSMENT
Pt is a 63y/o male with medical h/o HTN, HDL, and T2DM, reports Malignant neoplasm rectum discovered on colonoscopy evaluation now s/p scheduled Colectomy Partial w/Anastomosis, Cystourethroscopy.    Medicine consulted for co-management.     #S/p laparotomy exploratory, with Kaylene procedure  -Ostomy bag in place  -IV fluids  -Is and Os  -Clear liquid diet, advance to low fiber diet once colostomy begins to function per surgery   -Pain regimen per surgery   -Bowel regimen per surgery  -monitor CBC and CMP     #T2DM  -A1c 6.3  -Well controlled  -Hold home metformin 500mg qd  -Cw ISS three times a day and at bedtime  -Can consider adding basal insulin if BS persistently >180  -Resume metformin on discharge    #HTN  -Cw home amlodipine 15mg qd   -Resumed home losartan 50mg qd today  -Resumed home kguuajcgnkmlenpofbh68.5mg qd today  -monitor BP     #HLD  -Cw home atorvastatin 40mg    DVT ppx: SCDs Start lovenox today     Case dw Dr. Ramon

## 2024-08-18 LAB
ALBUMIN SERPL ELPH-MCNC: 2.9 G/DL — LOW (ref 3.3–5)
ALP SERPL-CCNC: 86 U/L — SIGNIFICANT CHANGE UP (ref 40–120)
ALT FLD-CCNC: 17 U/L — SIGNIFICANT CHANGE UP (ref 10–45)
ANION GAP SERPL CALC-SCNC: 7 MMOL/L — SIGNIFICANT CHANGE UP (ref 5–17)
AST SERPL-CCNC: 13 U/L — SIGNIFICANT CHANGE UP (ref 10–40)
BILIRUB SERPL-MCNC: 1 MG/DL — SIGNIFICANT CHANGE UP (ref 0.2–1.2)
BUN SERPL-MCNC: 6 MG/DL — LOW (ref 7–23)
CALCIUM SERPL-MCNC: 8.8 MG/DL — SIGNIFICANT CHANGE UP (ref 8.4–10.5)
CHLORIDE SERPL-SCNC: 101 MMOL/L — SIGNIFICANT CHANGE UP (ref 96–108)
CO2 SERPL-SCNC: 31 MMOL/L — SIGNIFICANT CHANGE UP (ref 22–31)
CREAT SERPL-MCNC: 1.01 MG/DL — SIGNIFICANT CHANGE UP (ref 0.5–1.3)
EGFR: 83 ML/MIN/1.73M2 — SIGNIFICANT CHANGE UP
GLUCOSE BLDC GLUCOMTR-MCNC: 126 MG/DL — HIGH (ref 70–99)
GLUCOSE BLDC GLUCOMTR-MCNC: 129 MG/DL — HIGH (ref 70–99)
GLUCOSE BLDC GLUCOMTR-MCNC: 161 MG/DL — HIGH (ref 70–99)
GLUCOSE BLDC GLUCOMTR-MCNC: 169 MG/DL — HIGH (ref 70–99)
GLUCOSE SERPL-MCNC: 115 MG/DL — HIGH (ref 70–99)
HCT VFR BLD CALC: 31.4 % — LOW (ref 39–50)
HGB BLD-MCNC: 10.4 G/DL — LOW (ref 13–17)
MAGNESIUM SERPL-MCNC: 1.8 MG/DL — SIGNIFICANT CHANGE UP (ref 1.6–2.6)
MCHC RBC-ENTMCNC: 28.1 PG — SIGNIFICANT CHANGE UP (ref 27–34)
MCHC RBC-ENTMCNC: 33.1 GM/DL — SIGNIFICANT CHANGE UP (ref 32–36)
MCV RBC AUTO: 84.9 FL — SIGNIFICANT CHANGE UP (ref 80–100)
NRBC # BLD: 0 /100 WBCS — SIGNIFICANT CHANGE UP (ref 0–0)
PLATELET # BLD AUTO: 301 K/UL — SIGNIFICANT CHANGE UP (ref 150–400)
POTASSIUM SERPL-MCNC: 3.5 MMOL/L — SIGNIFICANT CHANGE UP (ref 3.5–5.3)
POTASSIUM SERPL-SCNC: 3.5 MMOL/L — SIGNIFICANT CHANGE UP (ref 3.5–5.3)
PROT SERPL-MCNC: 6.2 G/DL — SIGNIFICANT CHANGE UP (ref 6–8.3)
RBC # BLD: 3.7 M/UL — LOW (ref 4.2–5.8)
RBC # FLD: 13 % — SIGNIFICANT CHANGE UP (ref 10.3–14.5)
SODIUM SERPL-SCNC: 139 MMOL/L — SIGNIFICANT CHANGE UP (ref 135–145)
WBC # BLD: 6.71 K/UL — SIGNIFICANT CHANGE UP (ref 3.8–10.5)
WBC # FLD AUTO: 6.71 K/UL — SIGNIFICANT CHANGE UP (ref 3.8–10.5)

## 2024-08-18 PROCEDURE — 99232 SBSQ HOSP IP/OBS MODERATE 35: CPT

## 2024-08-18 RX ORDER — LOSARTAN POTASSIUM 50 MG/1
50 TABLET ORAL DAILY
Refills: 0 | Status: DISCONTINUED | OUTPATIENT
Start: 2024-08-19 | End: 2024-08-19

## 2024-08-18 RX ORDER — KETOROLAC TROMETHAMINE 30 MG/ML
15 INJECTION, SOLUTION INTRAMUSCULAR EVERY 8 HOURS
Refills: 0 | Status: DISCONTINUED | OUTPATIENT
Start: 2024-08-18 | End: 2024-08-19

## 2024-08-18 RX ADMIN — Medication 1: at 09:10

## 2024-08-18 RX ADMIN — Medication 80 MILLILITER(S): at 16:38

## 2024-08-18 RX ADMIN — DEXTROSE, SODIUM ACETATE, POTASSIUM CHLORIDE, POTASSIUM PHOSPHATE, AND SODIUM CHLORIDE 100 MILLILITER(S): 5; .15; .13; .28; .091 INJECTION, SOLUTION INTRAVENOUS at 06:52

## 2024-08-18 RX ADMIN — Medication 40 MILLIGRAM(S): at 21:48

## 2024-08-18 RX ADMIN — ALVIMOPAN 12 MILLIGRAM(S): 12 CAPSULE ORAL at 07:38

## 2024-08-18 RX ADMIN — LOSARTAN POTASSIUM 50 MILLIGRAM(S): 50 TABLET ORAL at 06:51

## 2024-08-18 RX ADMIN — Medication 1: at 13:02

## 2024-08-18 RX ADMIN — KETOROLAC TROMETHAMINE 15 MILLIGRAM(S): 30 INJECTION, SOLUTION INTRAMUSCULAR at 21:48

## 2024-08-18 RX ADMIN — ACETAMINOPHEN 975 MILLIGRAM(S): 325 TABLET ORAL at 15:45

## 2024-08-18 RX ADMIN — ACETAMINOPHEN 975 MILLIGRAM(S): 325 TABLET ORAL at 16:15

## 2024-08-18 RX ADMIN — ALVIMOPAN 12 MILLIGRAM(S): 12 CAPSULE ORAL at 17:16

## 2024-08-18 RX ADMIN — AMLODIPINE BESYLATE 10 MILLIGRAM(S): 10 TABLET ORAL at 06:51

## 2024-08-18 RX ADMIN — ENOXAPARIN SODIUM 40 MILLIGRAM(S): 100 INJECTION SUBCUTANEOUS at 06:51

## 2024-08-18 NOTE — PROGRESS NOTE ADULT - SUBJECTIVE AND OBJECTIVE BOX
POD #3    Remains afebrile with a normal WBC  Tolerated clear fluid diet  Doing better OOB  Dressing dry and stoma is healthy and functioning    PLAN: Advance to low fiber diet           Continue colostomy care teaching           Wound packing to be removed           POSSIBLE discharge in the morning

## 2024-08-18 NOTE — PROGRESS NOTE ADULT - SUBJECTIVE AND OBJECTIVE BOX
Pt is a 65y/o male with medical h/o HTN, HDL, and T2DM, reports Malignant neoplasm rectum discovered on colonoscopy evaluation. Pt now s/p scheduled Colectomy Partial w/Anastomosis, Cystourethroscopy.    Medicine team consulted for co-management post op.    Overnight events: None  SUBJECTIVE:  Interval HPI: Patient states that he is feeling well. He understands the follow up as per the surgeon. Pt denies any lightheadedness, dizziness, chest pain, or SOB. Pt tolerating clear liquid diet well. Pt preparing for ostomy care training as per surgery. No other acute concerns at this time.     REVIEW OF SYSTEMS:  CONSTITUTIONAL: (-) weakness, (-) fevers, (-) chills  EYES/ENT: (-) visual changes,  (-) vertigo,  (-) throat pain   NECK:  (-) pain, (-) stiffness  RESPIRATORY:  (-) shortness of breath, (-) cough,  (-) wheezing,  (-) hemoptysis   CARDIOVASCULAR:  (-) chest pain, (-) palpitations  GASTROINTESTINAL:  (+) abdominal or epigastric pain, (-) nausea, (-) vomiting, (-) diarrhea, (-) constipation, (-) melena,  (-) hematemesis,  (-) hematochezia  GENITOURINARY: (-) dysuria, (-) frequency, (-) hematuria  NEUROLOGICAL: (-) numbness, (-) weakness  SKIN: (-) itching, (-) rashes, (-) lesions    Vital Signs Last 24 Hrs  T(C): 36.6 (18 Aug 2024 04:40), Max: 36.6 (18 Aug 2024 04:40)  T(F): 97.9 (18 Aug 2024 04:40), Max: 97.9 (18 Aug 2024 04:40)  HR: 68 (18 Aug 2024 04:40) (68 - 75)  BP: 125/78 (18 Aug 2024 04:40) (125/77 - 125/78)  BP(mean): --  RR: 16 (18 Aug 2024 04:40) (16 - 17)  SpO2: 95% (18 Aug 2024 04:40) (95% - 96%)    Parameters below as of 18 Aug 2024 04:40  Patient On (Oxygen Delivery Method): room air      PHYSICAL EXAM:  GENERAL: NAD, lying in bed comfortably  HEAD:  Atraumatic, Normocephalic  RESP: Clear to auscultation bilaterally, good air entry bilaterally; No wheezing, rales, or rhonchi. Unlabored respirations  CARDIAC: Regular rate and rhythm. S1 and S2. No murmurs, rubs, or gallops  GI: Well - healing colostomy; Ostomy present, serosanguinous liquid present in bag.   EXTREMITIES:  2+ Peripheral Pulses. Capillary refill <2 seconds. No clubbing, cyanosis, or edema  NERVOUS SYSTEM:  Alert & Oriented X3, speech clear. No deficits   MSK: FROM all 4 extremities, full and equal strength      CAPILLARY BLOOD GLUCOSE  POCT Blood Glucose.: 169 mg/dL (18 Aug 2024 08:41)  POCT Blood Glucose.: 146 mg/dL (17 Aug 2024 21:30)  POCT Blood Glucose.: 158 mg/dL (17 Aug 2024 18:01)  POCT Blood Glucose.: 150 mg/dL (17 Aug 2024 12:48)

## 2024-08-18 NOTE — PROGRESS NOTE ADULT - ASSESSMENT
Pt is a 65y/o male with medical h/o HTN, HDL, and T2DM, reports Malignant neoplasm rectum discovered on colonoscopy evaluation now s/p scheduled Colectomy Partial w/Anastomosis, Cystourethroscopy.    Medicine consulted for co-management.     #S/p laparotomy exploratory, with Kaylene procedure  -Ostomy bag in place  -IV fluids  -Is and Os  -Clear liquid diet, advance to low fiber diet once colostomy begins to function per surgery   -Pain regimen per surgery   -Bowel regimen per surgery  -monitor CBC and CMP     #T2DM, well-controlled   -A1c 6.3%  -Hold home metformin 500mg qd  -C/w ISS three times a day and at bedtime  -Can consider adding basal insulin if BS persistently >180  -Resume metformin on discharge    #HTN, well-controlled  -C/w home amlodipine 15mg qd   -c/w home losartan 50mg qd today  -c/w home fsdsoktpirqeqyeyjyz26.5mg qd   -monitor BP     #HLD  -C/w home atorvastatin 40mg    #DVT ppx  -SCDs   - c/w lovenox     Case d/w Dr. Ramon

## 2024-08-19 ENCOUNTER — TRANSCRIPTION ENCOUNTER (OUTPATIENT)
Age: 64
End: 2024-08-19

## 2024-08-19 VITALS
TEMPERATURE: 98 F | OXYGEN SATURATION: 98 % | HEART RATE: 75 BPM | RESPIRATION RATE: 11 BRPM | SYSTOLIC BLOOD PRESSURE: 135 MMHG | DIASTOLIC BLOOD PRESSURE: 76 MMHG

## 2024-08-19 LAB
ANION GAP SERPL CALC-SCNC: 7 MMOL/L — SIGNIFICANT CHANGE UP (ref 5–17)
BUN SERPL-MCNC: 6 MG/DL — LOW (ref 7–23)
CALCIUM SERPL-MCNC: 9.2 MG/DL — SIGNIFICANT CHANGE UP (ref 8.4–10.5)
CHLORIDE SERPL-SCNC: 102 MMOL/L — SIGNIFICANT CHANGE UP (ref 96–108)
CO2 SERPL-SCNC: 32 MMOL/L — HIGH (ref 22–31)
CREAT SERPL-MCNC: 1.1 MG/DL — SIGNIFICANT CHANGE UP (ref 0.5–1.3)
EGFR: 75 ML/MIN/1.73M2 — SIGNIFICANT CHANGE UP
GLUCOSE BLDC GLUCOMTR-MCNC: 138 MG/DL — HIGH (ref 70–99)
GLUCOSE BLDC GLUCOMTR-MCNC: 158 MG/DL — HIGH (ref 70–99)
GLUCOSE SERPL-MCNC: 118 MG/DL — HIGH (ref 70–99)
HCT VFR BLD CALC: 31.5 % — LOW (ref 39–50)
HGB BLD-MCNC: 10.6 G/DL — LOW (ref 13–17)
MAGNESIUM SERPL-MCNC: 1.8 MG/DL — SIGNIFICANT CHANGE UP (ref 1.6–2.6)
MCHC RBC-ENTMCNC: 28.6 PG — SIGNIFICANT CHANGE UP (ref 27–34)
MCHC RBC-ENTMCNC: 33.7 GM/DL — SIGNIFICANT CHANGE UP (ref 32–36)
MCV RBC AUTO: 84.9 FL — SIGNIFICANT CHANGE UP (ref 80–100)
NRBC # BLD: 0 /100 WBCS — SIGNIFICANT CHANGE UP (ref 0–0)
PHOSPHATE SERPL-MCNC: 4.2 MG/DL — SIGNIFICANT CHANGE UP (ref 2.5–4.5)
PLATELET # BLD AUTO: 316 K/UL — SIGNIFICANT CHANGE UP (ref 150–400)
POTASSIUM SERPL-MCNC: 3.6 MMOL/L — SIGNIFICANT CHANGE UP (ref 3.5–5.3)
POTASSIUM SERPL-SCNC: 3.6 MMOL/L — SIGNIFICANT CHANGE UP (ref 3.5–5.3)
RBC # BLD: 3.71 M/UL — LOW (ref 4.2–5.8)
RBC # FLD: 13 % — SIGNIFICANT CHANGE UP (ref 10.3–14.5)
SODIUM SERPL-SCNC: 141 MMOL/L — SIGNIFICANT CHANGE UP (ref 135–145)
WBC # BLD: 5.56 K/UL — SIGNIFICANT CHANGE UP (ref 3.8–10.5)
WBC # FLD AUTO: 5.56 K/UL — SIGNIFICANT CHANGE UP (ref 3.8–10.5)

## 2024-08-19 PROCEDURE — 82962 GLUCOSE BLOOD TEST: CPT

## 2024-08-19 PROCEDURE — 85027 COMPLETE CBC AUTOMATED: CPT

## 2024-08-19 PROCEDURE — C1758: CPT

## 2024-08-19 PROCEDURE — 80053 COMPREHEN METABOLIC PANEL: CPT

## 2024-08-19 PROCEDURE — C1769: CPT

## 2024-08-19 PROCEDURE — 36415 COLL VENOUS BLD VENIPUNCTURE: CPT

## 2024-08-19 PROCEDURE — 99232 SBSQ HOSP IP/OBS MODERATE 35: CPT

## 2024-08-19 PROCEDURE — 83735 ASSAY OF MAGNESIUM: CPT

## 2024-08-19 PROCEDURE — C1889: CPT

## 2024-08-19 PROCEDURE — C9399: CPT

## 2024-08-19 PROCEDURE — 80048 BASIC METABOLIC PNL TOTAL CA: CPT

## 2024-08-19 PROCEDURE — 84100 ASSAY OF PHOSPHORUS: CPT

## 2024-08-19 PROCEDURE — 88309 TISSUE EXAM BY PATHOLOGIST: CPT

## 2024-08-19 RX ADMIN — ENOXAPARIN SODIUM 40 MILLIGRAM(S): 100 INJECTION SUBCUTANEOUS at 06:36

## 2024-08-19 RX ADMIN — ACETAMINOPHEN 975 MILLIGRAM(S): 325 TABLET ORAL at 11:00

## 2024-08-19 RX ADMIN — KETOROLAC TROMETHAMINE 15 MILLIGRAM(S): 30 INJECTION, SOLUTION INTRAMUSCULAR at 06:36

## 2024-08-19 RX ADMIN — ACETAMINOPHEN 975 MILLIGRAM(S): 325 TABLET ORAL at 10:24

## 2024-08-19 RX ADMIN — LOSARTAN POTASSIUM 50 MILLIGRAM(S): 50 TABLET ORAL at 06:36

## 2024-08-19 RX ADMIN — ALVIMOPAN 12 MILLIGRAM(S): 12 CAPSULE ORAL at 06:36

## 2024-08-19 RX ADMIN — KETOROLAC TROMETHAMINE 15 MILLIGRAM(S): 30 INJECTION, SOLUTION INTRAMUSCULAR at 15:29

## 2024-08-19 RX ADMIN — AMLODIPINE BESYLATE 10 MILLIGRAM(S): 10 TABLET ORAL at 06:36

## 2024-08-19 NOTE — PROGRESS NOTE ADULT - PROVIDER SPECIALTY LIST ADULT
Critical Care
Family Medicine
Family Medicine
Surgery
Family Medicine
Surgery
Surgery
Family Medicine
Surgery

## 2024-08-19 NOTE — DISCHARGE NOTE NURSING/CASE MANAGEMENT/SOCIAL WORK - NSDCPEFALRISK_GEN_ALL_CORE
For information on Fall & Injury Prevention, visit: [email or web address] OR  [email or web address] OR  https://www.cdc.gov/steadi/patient.html

## 2024-08-19 NOTE — PROGRESS NOTE ADULT - ASSESSMENT
Pt is a 63y/o male with medical h/o HTN, HDL, and T2DM, reports Malignant neoplasm rectum discovered on colonoscopy evaluation now s/p scheduled Colectomy Partial w/Anastomosis, Cystourethroscopy.    Medicine consulted for co-management.     #S/p laparotomy exploratory, with Kaylene procedure  -Ostomy bag in place  -IV fluids  -Is and Os  -Clear liquid diet, advance to low fiber diet once colostomy begins to function per surgery   -Pain regimen per surgery   -Bowel regimen per surgery  -monitor CBC and CMP     #T2DM, well-controlled   -A1c 6.3%  -Hold home metformin 500mg qd  -C/w ISS three times a day and at bedtime  -Can consider adding basal insulin if BS persistently >180  -Resume metformin on discharge    #HTN, well-controlled  -C/w home amlodipine 15mg qd   -c/w home losartan 50mg qd today  -c/w home mqozraztximkrtbsxyo74.5mg qd   -monitor BP     #HLD  -C/w home atorvastatin 40mg    #DVT ppx  -SCDs   - c/w lovenox     Case d/w Dr. Ramon 76 Pt is a 65y/o male with medical h/o HTN, HDL, and T2DM, reports Malignant neoplasm rectum discovered on colonoscopy evaluation now s/p scheduled Colectomy Partial w/Anastomosis, Cystourethroscopy.    Medicine consulted for co-management.     #S/p laparotomy exploratory, with Kaylene procedure  -Ostomy bag in place  -IV fluids  -Is and Os  -Diet advanced to low fiber diet  -Pain regimen per surgery   -Bowel regimen per surgery  -monitor CBC and CMP     #T2DM, well-controlled   -A1c 6.3%  -Hold home metformin 500mg qd  -C/w ISS three times a day and at bedtime  -Can consider adding basal insulin if BS persistently >180  -Resume metformin on discharge    #HTN, well-controlled  -C/w home amlodipine 15mg qd   -c/w home losartan 50mg qd today  -c/w home jbeuttitfnzsvmyippx24.5mg qd   -monitor BP     #HLD  -C/w home atorvastatin 40mg    #DVT ppx  -SCDs   -c/w lovenox     Dispo: Per surgery team, possible discharge today    Case d/w Dr. Ramon

## 2024-08-19 NOTE — PROGRESS NOTE ADULT - ATTENDING COMMENTS
LF 3/9/2022 with 30 tabs + 5 refills   LOV 3/9/2022  Due for 3 months f/u
agree with above  FS controlled  continue ISS  BP controlled
agree with above  FS controlled  continue with ISS  BP stable, will monitor  surgery recs noted  will continue to follow
agree with above  surgery recs noted  pt tolerated surgery well  tolerating clears so far  c/w ISS  monitor FS
agree with above  medically stable for discharge from medical standpoint  will continue to follow

## 2024-08-19 NOTE — PROGRESS NOTE ADULT - ASSESSMENT
64M now POD#4 s/p lap converted to open sigmoidectomy and cystoscopy  - Continue low fiber diet as tolerated  - Analgesics PRN  - Ostomy education  - Medical management as per primary team  - Discharge to home today  Plan discussed with Dr. Whiting

## 2024-08-19 NOTE — PROGRESS NOTE ADULT - SUBJECTIVE AND OBJECTIVE BOX
INTERVAL HPI/OVERNIGHT EVENTS:  Pt seen and examined at bedside resting comfortably. No acute events reported overnight. Pt offers no complaints at this time, feels well overall. Tolerating low fiber diet without N/V, ostomy functioning.  Denies fever/chills, chest pain, dyspnea, cough, dizziness.     Vital Signs Last 24 Hrs  T(C): 36.6 (19 Aug 2024 05:53), Max: 36.7 (18 Aug 2024 21:44)  T(F): 97.8 (19 Aug 2024 05:53), Max: 98.1 (18 Aug 2024 21:44)  HR: 65 (19 Aug 2024 05:53) (65 - 89)  BP: 140/81 (19 Aug 2024 05:53) (116/78 - 140/81)  BP(mean): --  RR: 18 (19 Aug 2024 05:53) (18 - 20)  SpO2: 100% (19 Aug 2024 05:53) (95% - 100%)    Parameters below as of 19 Aug 2024 05:53  Patient On (Oxygen Delivery Method): room air      PHYSICAL EXAM:  GENERAL: awake and alert, NAD  HEAD:  Atraumatic, Normocephalic  EYES: EOMI, PERRLA  CHEST/LUNG: non-labored breathing  ABDOMEN: soft, NT, minimally distended; incision C/D/I with dressing in place, ostomy pink and patent with gas and bilious stool in appliance  EXTREMITIES: moves all extremities spontaneously, no calf tenderness b/l    I&O's Detail    18 Aug 2024 07:01  -  19 Aug 2024 07:00  --------------------------------------------------------  IN:    dextrose 5% + sodium chloride 0.45% w/ Additives: 1000 mL    Lactated Ringers: 160 mL  Total IN: 1160 mL    OUT:    Colostomy (mL): 220 mL    Voided (mL): 1000 mL  Total OUT: 1220 mL    Total NET: -60 mL          LABS:                        10.6   5.56  )-----------( 316      ( 19 Aug 2024 05:49 )             31.5     08-19    141  |  102  |  6<L>  ----------------------------<  118<H>  3.6   |  32<H>  |  1.10    Ca    9.2      19 Aug 2024 05:49  Phos  4.2     08-19  Mg     1.8     08-19    TPro  6.2  /  Alb  2.9<L>  /  TBili  1.0  /  DBili  x   /  AST  13  /  ALT  17  /  AlkPhos  86  08-18      Urinalysis Basic - ( 19 Aug 2024 05:49 )    Color: x / Appearance: x / SG: x / pH: x  Gluc: 118 mg/dL / Ketone: x  / Bili: x / Urobili: x   Blood: x / Protein: x / Nitrite: x   Leuk Esterase: x / RBC: x / WBC x   Sq Epi: x / Non Sq Epi: x / Bacteria: x

## 2024-08-19 NOTE — DISCHARGE NOTE NURSING/CASE MANAGEMENT/SOCIAL WORK - CASE MANAGER'S NAME
Called [facility]  They wanted to get 90 days filled instead of 30 days with 2 refills  Changed to 90 days  LMOM to let the pt know  Hayley Ceballos

## 2024-08-19 NOTE — DISCHARGE NOTE NURSING/CASE MANAGEMENT/SOCIAL WORK - PATIENT PORTAL LINK FT
You can access the FollowMyHealth Patient Portal offered by [facility] by registering at the following website: [email or web address]. By joining [employer]’s FollowMyHealth portal, you will also be able to view your health information using other applications (apps) compatible with our system.

## 2024-08-19 NOTE — PROGRESS NOTE ADULT - SUBJECTIVE AND OBJECTIVE BOX
Pt is a 65y/o male with medical h/o HTN, HDL, and T2DM, reports Malignant neoplasm rectum discovered on colonoscopy evaluation. Pt now s/p scheduled Colectomy Partial w/Anastomosis, Cystourethroscopy.    Medicine team consulted for co-management post op.    Overnight events: None  SUBJECTIVE:  Interval HPI: Patient states that he is feeling well. He understands the follow up as per the surgeon. Pt denies any lightheadedness, dizziness, chest pain, or SOB. Pt tolerating clear liquid diet well. Pt preparing for ostomy care training as per surgery. No other acute concerns at this time.     REVIEW OF SYSTEMS:  CONSTITUTIONAL: (-) weakness, (-) fevers, (-) chills  EYES/ENT: (-) visual changes,  (-) vertigo,  (-) throat pain   NECK:  (-) pain, (-) stiffness  RESPIRATORY:  (-) shortness of breath, (-) cough,  (-) wheezing,  (-) hemoptysis   CARDIOVASCULAR:  (-) chest pain, (-) palpitations  GASTROINTESTINAL:  (+) abdominal or epigastric pain, (-) nausea, (-) vomiting, (-) diarrhea, (-) constipation, (-) melena,  (-) hematemesis,  (-) hematochezia  GENITOURINARY: (-) dysuria, (-) frequency, (-) hematuria  NEUROLOGICAL: (-) numbness, (-) weakness  SKIN: (-) itching, (-) rashes, (-) lesions    Vital Signs Last 24 Hrs  T(C): 36.6 (19 Aug 2024 05:53), Max: 36.7 (18 Aug 2024 21:44)  T(F): 97.8 (19 Aug 2024 05:53), Max: 98.1 (18 Aug 2024 21:44)  HR: 65 (19 Aug 2024 05:53) (65 - 89)  BP: 140/81 (19 Aug 2024 05:53) (116/78 - 140/81)  BP(mean): --  RR: 18 (19 Aug 2024 05:53) (18 - 20)  SpO2: 100% (19 Aug 2024 05:53) (95% - 100%)    Parameters below as of 19 Aug 2024 05:53  Patient On (Oxygen Delivery Method): room air      PHYSICAL EXAM:  GENERAL: NAD, lying in bed comfortably  HEAD:  Atraumatic, Normocephalic  RESP: Clear to auscultation bilaterally, good air entry bilaterally; No wheezing, rales, or rhonchi. Unlabored respirations  CARDIAC: Regular rate and rhythm. S1 and S2. No murmurs, rubs, or gallops  GI: Well - healing colostomy; Ostomy present, serosanguinous liquid present in bag.   EXTREMITIES:  2+ Peripheral Pulses. Capillary refill <2 seconds. No clubbing, cyanosis, or edema  NERVOUS SYSTEM:  Alert & Oriented X3, speech clear. No deficits   MSK: FROM all 4 extremities, full and equal strength      CAPILLARY BLOOD GLUCOSE  POCT Blood Glucose.: 138 mg/dL (19 Aug 2024 09:47)  POCT Blood Glucose.: 126 mg/dL (18 Aug 2024 21:47)  POCT Blood Glucose.: 129 mg/dL (18 Aug 2024 17:01)  POCT Blood Glucose.: 161 mg/dL (18 Aug 2024 12:44)                     Pt is a 65y/o male with medical h/o HTN, HDL, and T2DM, reports Malignant neoplasm rectum discovered on colonoscopy evaluation. Pt now s/p scheduled Colectomy Partial w/Anastomosis, Cystourethroscopy.    Medicine team consulted for co-management post op.    Overnight events: None  SUBJECTIVE:  Interval HPI: Patient states that he is feeling well. He understands the follow up as per the surgeon. Pt denies any lightheadedness, dizziness, chest pain, or SOB. Pt tolerating low fiber diet well. Pt preparing for ostomy care training as per surgery. No other acute concerns at this time.     REVIEW OF SYSTEMS:  CONSTITUTIONAL: (-) weakness, (-) fevers, (-) chills  EYES/ENT: (-) visual changes,  (-) vertigo,  (-) throat pain   NECK:  (-) pain, (-) stiffness  RESPIRATORY:  (-) shortness of breath, (-) cough,  (-) wheezing,  (-) hemoptysis   CARDIOVASCULAR:  (-) chest pain, (-) palpitations  GASTROINTESTINAL:  (+) abdominal or epigastric pain, (-) nausea, (-) vomiting, (-) diarrhea, (-) constipation, (-) melena,  (-) hematemesis,  (-) hematochezia  GENITOURINARY: (-) dysuria, (-) frequency, (-) hematuria  NEUROLOGICAL: (-) numbness, (-) weakness  SKIN: (-) itching, (-) rashes, (-) lesions    Vital Signs Last 24 Hrs  T(C): 36.6 (19 Aug 2024 05:53), Max: 36.7 (18 Aug 2024 21:44)  T(F): 97.8 (19 Aug 2024 05:53), Max: 98.1 (18 Aug 2024 21:44)  HR: 65 (19 Aug 2024 05:53) (65 - 89)  BP: 140/81 (19 Aug 2024 05:53) (116/78 - 140/81)  BP(mean): --  RR: 18 (19 Aug 2024 05:53) (18 - 20)  SpO2: 100% (19 Aug 2024 05:53) (95% - 100%)    Parameters below as of 19 Aug 2024 05:53  Patient On (Oxygen Delivery Method): room air      PHYSICAL EXAM:  GENERAL: NAD, lying in bed comfortably  HEAD:  Atraumatic, Normocephalic  RESP: Clear to auscultation bilaterally, good air entry bilaterally; No wheezing, rales, or rhonchi. Unlabored respirations  CARDIAC: Regular rate and rhythm. S1 and S2. No murmurs, rubs, or gallops  GI: Well - healing colostomy; Ostomy present, serosanguinous liquid present in bag.   EXTREMITIES:  2+ Peripheral Pulses. Capillary refill <2 seconds. No clubbing, cyanosis, or edema  NERVOUS SYSTEM:  Alert & Oriented X3, speech clear. No deficits   MSK: FROM all 4 extremities, full and equal strength      CAPILLARY BLOOD GLUCOSE  POCT Blood Glucose.: 138 mg/dL (19 Aug 2024 09:47)  POCT Blood Glucose.: 126 mg/dL (18 Aug 2024 21:47)  POCT Blood Glucose.: 129 mg/dL (18 Aug 2024 17:01)  POCT Blood Glucose.: 161 mg/dL (18 Aug 2024 12:44)

## 2024-08-20 NOTE — ASU PATIENT PROFILE, ADULT - INTERNATIONAL TRAVEL
"Reason For Consult  Scrotal swollen    History Of Present Illness  Arsh Manzo is a 39 y.o. male presenting with 2 weeks history of left scrotal swollen and pain    Exam: Circumcised normal penis, left scrotum 4 times enlarged and tender, mild erythematous.  No discharge    Scrotal ultrasound: Left epididymoorchitis     Past Medical History  He has no past medical history on file.    Surgical History  He has no past surgical history on file.     Social History  He reports that he has never smoked. He has never used smokeless tobacco. He reports current alcohol use. He reports that he does not use drugs.    Family History  No family history on file.     Allergies  Patient has no known allergies.    Review of Systems       Physical Exam  The patient appeared well nourished and normally developed. Vital signs as documented. Head exam is unremarkable. No scleral icterus or corneal arcus noted. Neck is without jugular venous distension, thyromegaly, or carotid bruits. Carotid upstrokes are brisk bilaterally. Lungs are clear to auscultation and percussion. Cardiac exam reveals the PMI to be normally sized and situated. Rhythm is regular. First and second heart sounds normal. No murmurs, rubs or gallops. Abdominal exam reveals normal bowel sounds, no masses, no organomegaly and no aortic enlargement. Extremities are nonedematous and both femoral and pedal pulses are normal.     Last Recorded Vitals  Blood pressure 119/68, pulse 84, temperature 36.7 °C (98.1 °F), temperature source Temporal, resp. rate 16, height 1.778 m (5' 10\"), weight 83.9 kg (185 lb), SpO2 96%.    Relevant Results      US scrotum w doppler     Result Date: 8/18/2024  STUDY: Ultrasound of the scrotum and testicular with Doppler evaluation; 8/18/2024 4:48 PM INDICATION: Testicular swelling. COMPARISON: US scrotum 8/14/2024 ACCESSION NUMBER(S): [id number] ORDERING CLINICIAN: LESLEY CHÁVEZ FINDINGS: The right testicle measures 3.7 x 3.9 x 2.0 cm.  It " is heterogeneous in echotexture.  Normal color and spectral Doppler flow is demonstrated.    The right epididymis measures 0.9 x 0.6 x 1.2 cm and demonstrates normal echogenicity. The left testicle measures 4.4 x 2.8 x 4.5 cm. It is heterogeneous in echotexture.  Increased color and spectral Doppler flow is demonstrated.    The left epididymis measures 0.8 x 1.2 x 2.9 cm and demonstrates heterogeneous echogenicity as well as increased color flow. There is a complex left-sided hydrocele present. There is bilateral skin thickening.     Heterogeneous, hypervascular left testicle and epididymis suggestive of epididymo-orchitis.  Complex left-sided hydrocele.  Bilateral skin thickening. Normal right testicular spectral Doppler evaluation     Assessment/Plan     Impression  Left epididymoorchitis    Plan  Oral antibiotics Cipro or Bactrim DS for 2 weeks  Okay to AK home from  standpoint  Follow-up as needed as outpatient  Discussed with patient who agrees    I spent 25 minutes in the professional and overall care of this patient.      Andre Pearson MD     No

## 2024-08-21 LAB — SURGICAL PATHOLOGY STUDY: SIGNIFICANT CHANGE UP

## 2024-08-24 NOTE — RESULTS/DATA
[] : results reviewed [de-identified] : 7/9 [de-identified] : 7/9 [de-identified] : 8/13 [de-identified] : TSH suppressed/T3/T4 WNL - plan for uptake study outpatient with Dr Jyoti Palmer

## 2024-08-24 NOTE — RESULTS/DATA
[] : results reviewed [de-identified] : 7/9 [de-identified] : 7/9 [de-identified] : 8/13 [de-identified] : TSH suppressed/T3/T4 WNL - plan for uptake study outpatient with Dr Jyoti Palmer

## 2024-08-24 NOTE — RESULTS/DATA
[] : not indicated [de-identified] : 7/9 [de-identified] : 7/9 [de-identified] : 8/13 [de-identified] : TSH suppressed/T3/T4 WNL - plan for uptake study outpatient with Dr Jyoti Palmer

## 2024-08-24 NOTE — HISTORY OF PRESENT ILLNESS
[No Pertinent Cardiac History] : no history of aortic stenosis, atrial fibrillation, coronary artery disease, recent myocardial infarction, or implantable device/pacemaker [No Pertinent Pulmonary History] : no history of asthma, COPD, sleep apnea, or smoking [No Adverse Anesthesia Reaction] : no adverse anesthesia reaction in self or family member [Diabetes] : diabetes [(Patient denies any chest pain, claudication, dyspnea on exertion, orthopnea, palpitations or syncope)] : Patient denies any chest pain, claudication, dyspnea on exertion, orthopnea, palpitations or syncope [Moderate (4-6 METs)] : Moderate (4-6 METs) [Chronic Anticoagulation] : no chronic anticoagulation [Chronic Kidney Disease] : no chronic kidney disease [FreeTextEntry1] : L colectomy w/ anastamosis vs colostomy  [FreeTextEntry2] : 8/15/24 [FreeTextEntry3] : Dr Whiting [FreeTextEntry4] : Patient is a 63-year-old male with PMHx of type 2 diabetes (last A1c was 7.1), HTN, HLD, thyroid nodules, w/ newly diagnosed sigmoid mass w/ path revealing moderately differentiated adenocarcinoma. CT showing 5 cm annular distal sigmoid mass w/ luminal narrowing w/o definitive evidence of metastatic disease but few sub-4mm pulm nodules noted.   No personal or FHx of complications with anesthesia, sudden cardiac death, bleeding/clotting disorders  [FreeTextEntry7] : EKG 8/13 reviewed, sinus, rate 75, LAD, qtc <450ms [FreeTextEntry8] : Walks daily, can climb flight of stairs/walk up hill without chest pain/SOB

## 2024-09-04 ENCOUNTER — APPOINTMENT (OUTPATIENT)
Dept: SURGERY | Facility: CLINIC | Age: 64
End: 2024-09-04
Payer: COMMERCIAL

## 2024-09-04 VITALS — BODY MASS INDEX: 31.83 KG/M2 | WEIGHT: 210 LBS | HEIGHT: 68 IN | TEMPERATURE: 97.8 F | RESPIRATION RATE: 16 BRPM

## 2024-09-04 PROCEDURE — 99024 POSTOP FOLLOW-UP VISIT: CPT

## 2024-09-04 NOTE — PHYSICAL EXAM
[Abdominal Masses] : No abdominal masses [Abdomen Tenderness] : ~T ~M No abdominal tenderness [de-identified] : wound healing well; colostomy healthy

## 2024-09-11 ENCOUNTER — APPOINTMENT (OUTPATIENT)
Dept: SURGERY | Facility: CLINIC | Age: 64
End: 2024-09-11
Payer: COMMERCIAL

## 2024-09-11 PROCEDURE — 99024 POSTOP FOLLOW-UP VISIT: CPT

## 2024-09-11 NOTE — PHYSICAL EXAM
[Abdominal Masses] : No abdominal masses [Abdomen Tenderness] : ~T ~M No abdominal tenderness [de-identified] : wound healing well

## 2024-09-12 ENCOUNTER — OUTPATIENT (OUTPATIENT)
Dept: OUTPATIENT SERVICES | Facility: HOSPITAL | Age: 64
LOS: 1 days | Discharge: HOME | End: 2024-09-12

## 2024-09-12 DIAGNOSIS — Z98.890 OTHER SPECIFIED POSTPROCEDURAL STATES: Chronic | ICD-10-CM

## 2024-09-12 DIAGNOSIS — C18.9 MALIGNANT NEOPLASM OF COLON, UNSPECIFIED: ICD-10-CM

## 2024-09-12 NOTE — CONSULT LETTER
[Dear  ___] : Dear  [unfilled], [Consult Letter:] : I had the pleasure of evaluating your patient, [unfilled]. [Please see my note below.] : Please see my note below. [Consult Closing:] : Thank you very much for allowing me to participate in the care of this patient.  If you have any questions, please do not hesitate to contact me. [Sincerely,] : Sincerely, [DrJairo  ___] : Dr. RILEY [FreeTextEntry3] : Dinah Rizzo MD

## 2024-09-12 NOTE — HISTORY OF PRESENT ILLNESS
[Disease: _____________________] : Disease: [unfilled] [T: ___] : T[unfilled] [N: ___] : N[unfilled] [AJCC Stage: ____] : AJCC Stage: [unfilled] [de-identified] : Patient presenting at the request of his colorectal surgeon for oncology consultation for colon carcinoma.  8/2/2024-EGD (Dr. RASHEL Funk)-erosive gastritis with path showing mild chronic gastritis.  No H. pylori. Reflux esophagitis. EG junction biopsy revealed hyperplastic squamous mucosa with mild chronic inflammation. 8/2/2024-Colonoscopy -protruding fungating circumferential nonbleeding mass of malignant appearance was found in the rectosigmoid junction at 20 cm from the anus.  The mass caused a partial obstruction.  Scope could not traverse the lesion and the exam could not be finished.  Colon mass at 20 cm with pathology revealing adenocarcinoma, moderately differentiated.  No evidence of DNA mismatch repair deficiency.  8/12/2024-CT C/A/P- Primary distal sigmoid mass. No definitive evidence of metastatic disease. Few tiny sub-4 mm pulmonary nodules, attention on follow-up.  8/23/2024-Status post sigmoidectomy with pathology revealing adenocarcinoma, grade 2, moderately differentiated, invading through the muscularis propria into the pericolonic or perirectal tissue.  No macroscopic tumor perforation.  No LVI.  No perineural invasion.  Tumor budding score intermediate (5-9).  Margins negative.  24 regional lymph nodes negative for tumor.  1 tumor deposit.  *Note : The tumor deposit is a 4 mm nodule of malignant glands in a background of dense fibrous tissue and chronic inflammation. In the absence of positive lymph nodes (as in this case),  it is categorized as pN1c.  Patient currently [de-identified] : Adenocarcinoma [de-identified] : pMMR; CEA=

## 2024-09-12 NOTE — HISTORY OF PRESENT ILLNESS
[Disease: _____________________] : Disease: [unfilled] [T: ___] : T[unfilled] [N: ___] : N[unfilled] [AJCC Stage: ____] : AJCC Stage: [unfilled] [de-identified] : Patient presenting at the request of his colorectal surgeon for oncology consultation for colon carcinoma.  8/2/2024-EGD (Dr. RASHEL Funk)-erosive gastritis with path showing mild chronic gastritis.  No H. pylori. Reflux esophagitis. EG junction biopsy revealed hyperplastic squamous mucosa with mild chronic inflammation. 8/2/2024-Colonoscopy -protruding fungating circumferential nonbleeding mass of malignant appearance was found in the rectosigmoid junction at 20 cm from the anus.  The mass caused a partial obstruction.  Scope could not traverse the lesion and the exam could not be finished.  Colon mass at 20 cm with pathology revealing adenocarcinoma, moderately differentiated.  No evidence of DNA mismatch repair deficiency.  8/12/2024-CT C/A/P- Primary distal sigmoid mass. No definitive evidence of metastatic disease. Few tiny sub-4 mm pulmonary nodules, attention on follow-up.  8/23/2024-Status post sigmoidectomy with pathology revealing adenocarcinoma, grade 2, moderately differentiated, invading through the muscularis propria into the pericolonic or perirectal tissue.  No macroscopic tumor perforation.  No LVI.  No perineural invasion.  Tumor budding score intermediate (5-9).  Margins negative.  24 regional lymph nodes negative for tumor.  1 tumor deposit.  *Note : The tumor deposit is a 4 mm nodule of malignant glands in a background of dense fibrous tissue and chronic inflammation. In the absence of positive lymph nodes (as in this case),  it is categorized as pN1c.  Patient currently [de-identified] : Adenocarcinoma [de-identified] : pMMR; CEA=

## 2024-09-12 NOTE — HISTORY OF PRESENT ILLNESS
[Disease: _____________________] : Disease: [unfilled] [T: ___] : T[unfilled] [N: ___] : N[unfilled] [AJCC Stage: ____] : AJCC Stage: [unfilled] [de-identified] : Patient presenting at the request of his colorectal surgeon for oncology consultation for colon carcinoma.  8/2/2024-EGD (Dr. RASHEL Funk)-erosive gastritis with path showing mild chronic gastritis.  No H. pylori. Reflux esophagitis. EG junction biopsy revealed hyperplastic squamous mucosa with mild chronic inflammation. 8/2/2024-Colonoscopy -protruding fungating circumferential nonbleeding mass of malignant appearance was found in the rectosigmoid junction at 20 cm from the anus.  The mass caused a partial obstruction.  Scope could not traverse the lesion and the exam could not be finished.  Colon mass at 20 cm with pathology revealing adenocarcinoma, moderately differentiated.  No evidence of DNA mismatch repair deficiency.  8/12/2024-CT C/A/P- Primary distal sigmoid mass. No definitive evidence of metastatic disease. Few tiny sub-4 mm pulmonary nodules, attention on follow-up.  8/23/2024-Status post sigmoidectomy with pathology revealing adenocarcinoma, grade 2, moderately differentiated, invading through the muscularis propria into the pericolonic or perirectal tissue.  No macroscopic tumor perforation.  No LVI.  No perineural invasion.  Tumor budding score intermediate (5-9).  Margins negative.  24 regional lymph nodes negative for tumor.  1 tumor deposit.  *Note : The tumor deposit is a 4 mm nodule of malignant glands in a background of dense fibrous tissue and chronic inflammation. In the absence of positive lymph nodes (as in this case),  it is categorized as pN1c.  Patient currently [de-identified] : Adenocarcinoma [de-identified] : pMMR; CEA=

## 2024-09-12 NOTE — ASSESSMENT
[FreeTextEntry1] : EGD/colonoscopy reports, pathology reports, CT C/A/P report, lab work, 9/11/24 surgery note reviewed. Stage IIIB (T3N1c) Adenocarcinoma of the sigmoid colon, s/p sigmoidectomy 8/23/2024. Tumor pMMR. Reviewed diagnosis/prognosis with cancer recurrence risks and treatment options. Discussed roles of surgery and systemic therapy in colon cancer management. As per NCCN guidelines, patients with low risk stage III disease (including T1-3, N1) 3 months of CapeOx appeared similar to 6 months of CapeOx for 5-year overall survival (82.1% versus 81.2%) with less toxicity.  3 months of CapeOx is noninferior to 6 months of CapeOx for disease-free survival. Reviewed adjuvant chemotherapy alternatives with respective potential benefits/side effects.  Would favor 3 months of CapeOx here rather than 6 months of CapeOx or 6 months of FOLFOX, considering potential adverse effects of various protocols. Potential side effects of capecitabine/oxaliplatin reviewed including but not limited to alopecia, allergic reaction, nausea/vomiting/diarrhea, neurotoxicity, cytopenias.  Patient -- Obtain lab work, CEA -- Would need Mediport placement for vascular access-patient --EKG??  Patient was given the opportunity to ask questions.  His questions have been answered to his apparent satisfaction at this time.  He expresses understanding and willingness to comply with recommended follow-up.  --> Tentatively plan: Oxaliplatin 130 mg/m IV day 1, capecitabine 1000 mg/m p.o. twice daily x 14 days.  Regimen to be given every 3 weeks x 4 cycles. RTO 1-2 weeks.

## 2024-09-16 ENCOUNTER — RESULT REVIEW (OUTPATIENT)
Age: 64
End: 2024-09-16

## 2024-09-16 ENCOUNTER — APPOINTMENT (OUTPATIENT)
Dept: HEMATOLOGY ONCOLOGY | Facility: CLINIC | Age: 64
End: 2024-09-16
Payer: COMMERCIAL

## 2024-09-16 VITALS
DIASTOLIC BLOOD PRESSURE: 74 MMHG | BODY MASS INDEX: 31.21 KG/M2 | HEIGHT: 67.99 IN | TEMPERATURE: 97.8 F | OXYGEN SATURATION: 98 % | RESPIRATION RATE: 16 BRPM | WEIGHT: 205.91 LBS | SYSTOLIC BLOOD PRESSURE: 119 MMHG | HEART RATE: 97 BPM

## 2024-09-16 DIAGNOSIS — Z87.891 PERSONAL HISTORY OF NICOTINE DEPENDENCE: ICD-10-CM

## 2024-09-16 LAB
BASOPHILS # BLD AUTO: 0.03 K/UL — SIGNIFICANT CHANGE UP (ref 0–0.2)
BASOPHILS NFR BLD AUTO: 0.7 % — SIGNIFICANT CHANGE UP (ref 0–2)
EOSINOPHIL # BLD AUTO: 0.13 K/UL — SIGNIFICANT CHANGE UP (ref 0–0.5)
EOSINOPHIL NFR BLD AUTO: 3 % — SIGNIFICANT CHANGE UP (ref 0–6)
HCT VFR BLD CALC: 34 % — LOW (ref 39–50)
HGB BLD-MCNC: 10.8 G/DL — LOW (ref 13–17)
IMM GRANULOCYTES NFR BLD AUTO: 0.2 % — SIGNIFICANT CHANGE UP (ref 0–0.9)
LYMPHOCYTES # BLD AUTO: 1.26 K/UL — SIGNIFICANT CHANGE UP (ref 1–3.3)
LYMPHOCYTES # BLD AUTO: 28.6 % — SIGNIFICANT CHANGE UP (ref 13–44)
MCHC RBC-ENTMCNC: 27.7 PG — SIGNIFICANT CHANGE UP (ref 27–34)
MCHC RBC-ENTMCNC: 31.8 G/DL — LOW (ref 32–36)
MCV RBC AUTO: 87.2 FL — SIGNIFICANT CHANGE UP (ref 80–100)
MONOCYTES # BLD AUTO: 0.44 K/UL — SIGNIFICANT CHANGE UP (ref 0–0.9)
MONOCYTES NFR BLD AUTO: 10 % — SIGNIFICANT CHANGE UP (ref 2–14)
NEUTROPHILS # BLD AUTO: 2.53 K/UL — SIGNIFICANT CHANGE UP (ref 1.8–7.4)
NEUTROPHILS NFR BLD AUTO: 57.5 % — SIGNIFICANT CHANGE UP (ref 43–77)
NRBC # BLD: 0 /100 WBCS — SIGNIFICANT CHANGE UP (ref 0–0)
PLATELET # BLD AUTO: 263 K/UL — SIGNIFICANT CHANGE UP (ref 150–400)
RBC # BLD: 3.9 M/UL — LOW (ref 4.2–5.8)
RBC # FLD: 13.7 % — SIGNIFICANT CHANGE UP (ref 10.3–14.5)
WBC # BLD: 4.4 K/UL — SIGNIFICANT CHANGE UP (ref 3.8–10.5)
WBC # FLD AUTO: 4.4 K/UL — SIGNIFICANT CHANGE UP (ref 3.8–10.5)

## 2024-09-16 PROCEDURE — G2211 COMPLEX E/M VISIT ADD ON: CPT | Mod: NC

## 2024-09-16 PROCEDURE — 99205 OFFICE O/P NEW HI 60 MIN: CPT

## 2024-09-16 RX ORDER — PROCHLORPERAZINE MALEATE 10 MG/1
10 TABLET ORAL
Qty: 30 | Refills: 2 | Status: ACTIVE | COMMUNITY
Start: 2024-09-16 | End: 1900-01-01

## 2024-09-16 RX ORDER — LIDOCAINE AND PRILOCAINE 25; 25 MG/G; MG/G
2.5-2.5 CREAM TOPICAL
Qty: 1 | Refills: 1 | Status: ACTIVE | COMMUNITY
Start: 2024-09-16 | End: 1900-01-01

## 2024-09-17 LAB
ALBUMIN SERPL ELPH-MCNC: 4.5 G/DL
ALP BLD-CCNC: 127 U/L
ALT SERPL-CCNC: 25 U/L
ANION GAP SERPL CALC-SCNC: 13 MMOL/L
AST SERPL-CCNC: 23 U/L
BILIRUB SERPL-MCNC: 0.7 MG/DL
BUN SERPL-MCNC: 13 MG/DL
CALCIUM SERPL-MCNC: 9.6 MG/DL
CEA SERPL-MCNC: 1.6 NG/ML
CHLORIDE SERPL-SCNC: 102 MMOL/L
CO2 SERPL-SCNC: 27 MMOL/L
CREAT SERPL-MCNC: 0.97 MG/DL
EGFR: 87 ML/MIN/1.73M2
GLUCOSE SERPL-MCNC: 117 MG/DL
HAV IGM SER QL: NONREACTIVE
HBV CORE IGG+IGM SER QL: NONREACTIVE
HBV CORE IGM SER QL: NONREACTIVE
HBV SURFACE AG SER QL: NONREACTIVE
HCV AB SER QL: NONREACTIVE
HCV S/CO RATIO: 0.14 S/CO
INR PPP: 0.95 RATIO
MAGNESIUM SERPL-MCNC: 2.3 MG/DL
POTASSIUM SERPL-SCNC: 3.9 MMOL/L
PROT SERPL-MCNC: 7.2 G/DL
PT BLD: 10.7 SEC
SODIUM SERPL-SCNC: 142 MMOL/L

## 2024-09-17 RX ORDER — CAPECITABINE 500 MG/1
500 TABLET ORAL
Qty: 112 | Refills: 0 | Status: ACTIVE | COMMUNITY
Start: 2024-09-16 | End: 1900-01-01

## 2024-09-18 ENCOUNTER — APPOINTMENT (OUTPATIENT)
Dept: FAMILY MEDICINE | Facility: HOSPITAL | Age: 64
End: 2024-09-18

## 2024-09-19 ENCOUNTER — TRANSCRIPTION ENCOUNTER (OUTPATIENT)
Age: 64
End: 2024-09-19

## 2024-09-23 ENCOUNTER — OUTPATIENT (OUTPATIENT)
Dept: OUTPATIENT SERVICES | Facility: HOSPITAL | Age: 64
LOS: 1 days | Discharge: HOME | End: 2024-09-23
Payer: COMMERCIAL

## 2024-09-23 ENCOUNTER — APPOINTMENT (OUTPATIENT)
Dept: INTERVENTIONAL RADIOLOGY/VASCULAR | Facility: HOSPITAL | Age: 64
End: 2024-09-23

## 2024-09-23 ENCOUNTER — TRANSCRIPTION ENCOUNTER (OUTPATIENT)
Age: 64
End: 2024-09-23

## 2024-09-23 ENCOUNTER — RESULT REVIEW (OUTPATIENT)
Age: 64
End: 2024-09-23

## 2024-09-23 DIAGNOSIS — Z98.890 OTHER SPECIFIED POSTPROCEDURAL STATES: Chronic | ICD-10-CM

## 2024-09-23 DIAGNOSIS — C19 MALIGNANT NEOPLASM OF RECTOSIGMOID JUNCTION: ICD-10-CM

## 2024-09-23 PROCEDURE — 36561 INSERT TUNNELED CV CATH: CPT | Mod: RT

## 2024-09-23 PROCEDURE — 77001 FLUOROGUIDE FOR VEIN DEVICE: CPT | Mod: 26

## 2024-09-23 PROCEDURE — 76937 US GUIDE VASCULAR ACCESS: CPT | Mod: 26

## 2024-09-23 NOTE — PROCEDURE NOTE - PROCEDURE FINDINGS AND DETAILS
Technically successful right chest wall port placement with tip terminating in the superior atriocaval junction.

## 2024-09-23 NOTE — PRE PROCEDURE NOTE - PRE PROCEDURE EVALUATION
Interventional Radiology    HPI: 64M w colon ca presents for port placement     Allergies: No Known Allergies    Imaging: Relevant imaging reviewed    Plan:   64M w colon ca presents for port placement   -- Relevant imaging and labs were reviewed.   -- No additional antibiotics are indicated for this procedure.   -- Risks, benefits, and alternatives were explained to the patient and informed consent was obtained.

## 2024-09-23 NOTE — ASU DISCHARGE PLAN (ADULT/PEDIATRIC) - ASU DC SPECIAL INSTRUCTIONSFT
Chest Port Placement    Discharge Instructions  - You have had a chest port implanted in your chest.   - The port is ready for use.  - You may shower in 48 hours. No soaking or swimming for 2 weeks or until the site is completely healed.  - Keep the area covered and dry for the next 7 days. It may be removed by a chemotherapy nurse as needed for treatment.  - Do not perform any heavy lifting or put tension on the area for the next week or until the site is healed.  - The skin glue (Dermabond) and/or Steri-Strips on your skin will act as a scab, allow it to fall off on its own over the next 1-3 weeks.  - You may resume your normal diet.  - You may resume your normal medications however you should wait 48 hours before restarting aspirin, plavix, or blood thinners.  - It is normal to experience some pain over the site for the next few days. You may take apply ice to the area (20 minutes on, 20 minutes off) and take Tylenol for that pain. Do not take more frequently than every 6 hours and do not exceed more than 3000mg of Tylenol in a 24 hour period.    - You were given conscious sedation which may make you drowsy, therefore you need someone to stay with you until the morning following the procedure.  - Do not drive, engage in heavy lifting or strenuous activity, or drink any alcoholic beverages for the next 24 hours.   - You may resume normal activity in 24 hours.    Notify your primary physician and/or Interventional Radiology IMMEDIATELY if you experience any of the following       - Fever of 101F or 38C       - Chills or Rigors/ Shakes       - Swelling and/or Redness in the area around the port       - Worsening Pain       - Blood soaked bandages or worsening bleeding       - Lightheadedness and/or dizziness upon standing       - Chest Pain/ Tightness       - Shortness of Breath       - Difficulty walking    If you have a problem that you believe requires IMMEDIATE attention, please go to your NEAREST Emergency Room. If you believe your problem can safely wait until you speak to a physician, please call Interventional Radiology for any concerns.    During Normal Weekday Business Hours- You can contact the Interventional Radiology department during normal business hours via telephone.  During Evenings and Weekends- If you need to contact Interventional Radiology during off hours, do so by calling the hospital and requesting to be connected to the Interventional Radiologist on call.

## 2024-09-24 LAB
DPYD GENOTYPE: NORMAL
DPYD INTERPRETATION: NORMAL
DPYD PHENOTYPE: NORMAL
DPYD SPECIMEN: NORMAL
PATHOLOGY STUDY: NORMAL

## 2024-09-24 NOTE — ED ADULT NURSE NOTE - NS PRO PASSIVE SMOKE EXP
Medical Necessity Information: It is in your best interest to select a reason for this procedure from the list below. All of these items fulfill various CMS LCD requirements except the new and changing color options. Medical Necessity Clause: This procedure was medically necessary because the lesion that was treated was: Lab: [id number] Lab Facility: [id number] Detail Level: Detailed Was A Bandage Applied: Yes Size Of Lesion In Cm (Required): 0.5 X Size Of Lesion In Cm (Optional): 0 Depth Of Shave: dermis Biopsy Method: Personna blade Anesthesia Type: 1% lidocaine with epinephrine and a 1:10 solution of 8.4% sodium bicarbonate Hemostasis: Electrocautery Wound Care: Petrolatum Render Path Notes In Note?: No Consent was obtained from the patient. The risks and benefits to therapy were discussed in detail. Specifically, the risks of infection, scarring, bleeding, prolonged wound healing, incomplete removal, allergy to anesthesia, nerve injury and recurrence were addressed. Prior to the procedure, the treatment site was clearly identified and confirmed by the patient. All components of Universal Protocol/PAUSE Rule completed. Post-Care Instructions: I reviewed with the patient in detail post-care instructions. Patient is to keep the biopsy site dry overnight, and then apply bacitracin twice daily until healed. Patient may apply hydrogen peroxide soaks to remove any crusting. Notification Instructions: Patient will be notified of pathology results. However, patient instructed to call the office if not contacted within 2 weeks. Billing Type: Third-Party Bill No

## 2024-09-30 ENCOUNTER — RX RENEWAL (OUTPATIENT)
Age: 64
End: 2024-09-30

## 2024-10-01 ENCOUNTER — RESULT REVIEW (OUTPATIENT)
Age: 64
End: 2024-10-01

## 2024-10-01 ENCOUNTER — APPOINTMENT (OUTPATIENT)
Dept: INFUSION THERAPY | Facility: HOSPITAL | Age: 64
End: 2024-10-01

## 2024-10-01 ENCOUNTER — APPOINTMENT (OUTPATIENT)
Dept: HEMATOLOGY ONCOLOGY | Facility: CLINIC | Age: 64
End: 2024-10-01
Payer: COMMERCIAL

## 2024-10-01 ENCOUNTER — NON-APPOINTMENT (OUTPATIENT)
Age: 64
End: 2024-10-01

## 2024-10-01 VITALS
HEART RATE: 71 BPM | DIASTOLIC BLOOD PRESSURE: 72 MMHG | RESPIRATION RATE: 16 BRPM | OXYGEN SATURATION: 98 % | TEMPERATURE: 98.2 F | SYSTOLIC BLOOD PRESSURE: 123 MMHG

## 2024-10-01 DIAGNOSIS — C19 MALIGNANT NEOPLASM OF RECTOSIGMOID JUNCTION: ICD-10-CM

## 2024-10-01 DIAGNOSIS — Z51.11 ENCOUNTER FOR ANTINEOPLASTIC CHEMOTHERAPY: ICD-10-CM

## 2024-10-01 LAB
ALBUMIN SERPL ELPH-MCNC: 4.1 G/DL — SIGNIFICANT CHANGE UP (ref 3.3–5)
ALP SERPL-CCNC: 99 U/L — SIGNIFICANT CHANGE UP (ref 40–120)
ALT FLD-CCNC: 7 U/L — LOW (ref 10–45)
ANION GAP SERPL CALC-SCNC: 12 MMOL/L — SIGNIFICANT CHANGE UP (ref 5–17)
AST SERPL-CCNC: 13 U/L — SIGNIFICANT CHANGE UP (ref 10–40)
BASOPHILS # BLD AUTO: 0.04 K/UL — SIGNIFICANT CHANGE UP (ref 0–0.2)
BASOPHILS NFR BLD AUTO: 0.8 % — SIGNIFICANT CHANGE UP (ref 0–2)
BILIRUB SERPL-MCNC: 0.3 MG/DL — SIGNIFICANT CHANGE UP (ref 0.2–1.2)
BUN SERPL-MCNC: 20 MG/DL — SIGNIFICANT CHANGE UP (ref 7–23)
CALCIUM SERPL-MCNC: 9.6 MG/DL — SIGNIFICANT CHANGE UP (ref 8.4–10.5)
CHLORIDE SERPL-SCNC: 104 MMOL/L — SIGNIFICANT CHANGE UP (ref 96–108)
CO2 SERPL-SCNC: 24 MMOL/L — SIGNIFICANT CHANGE UP (ref 22–31)
CREAT SERPL-MCNC: 1.01 MG/DL — SIGNIFICANT CHANGE UP (ref 0.5–1.3)
EGFR: 83 ML/MIN/1.73M2 — SIGNIFICANT CHANGE UP
EOSINOPHIL # BLD AUTO: 0.13 K/UL — SIGNIFICANT CHANGE UP (ref 0–0.5)
EOSINOPHIL NFR BLD AUTO: 2.6 % — SIGNIFICANT CHANGE UP (ref 0–6)
GLUCOSE SERPL-MCNC: 101 MG/DL — HIGH (ref 70–99)
HCT VFR BLD CALC: 33.7 % — LOW (ref 39–50)
HGB BLD-MCNC: 11.2 G/DL — LOW (ref 13–17)
IMM GRANULOCYTES NFR BLD AUTO: 3.4 % — HIGH (ref 0–0.9)
LYMPHOCYTES # BLD AUTO: 1.69 K/UL — SIGNIFICANT CHANGE UP (ref 1–3.3)
LYMPHOCYTES # BLD AUTO: 33.9 % — SIGNIFICANT CHANGE UP (ref 13–44)
MAGNESIUM SERPL-MCNC: 2.2 MG/DL — SIGNIFICANT CHANGE UP (ref 1.6–2.6)
MCHC RBC-ENTMCNC: 28.1 PG — SIGNIFICANT CHANGE UP (ref 27–34)
MCHC RBC-ENTMCNC: 33.2 G/DL — SIGNIFICANT CHANGE UP (ref 32–36)
MCV RBC AUTO: 84.5 FL — SIGNIFICANT CHANGE UP (ref 80–100)
MONOCYTES # BLD AUTO: 0.41 K/UL — SIGNIFICANT CHANGE UP (ref 0–0.9)
MONOCYTES NFR BLD AUTO: 8.2 % — SIGNIFICANT CHANGE UP (ref 2–14)
NEUTROPHILS # BLD AUTO: 2.54 K/UL — SIGNIFICANT CHANGE UP (ref 1.8–7.4)
NEUTROPHILS NFR BLD AUTO: 51.1 % — SIGNIFICANT CHANGE UP (ref 43–77)
NRBC # BLD: 0 /100 WBCS — SIGNIFICANT CHANGE UP (ref 0–0)
PLATELET # BLD AUTO: 315 K/UL — SIGNIFICANT CHANGE UP (ref 150–400)
POTASSIUM SERPL-MCNC: 3.8 MMOL/L — SIGNIFICANT CHANGE UP (ref 3.5–5.3)
POTASSIUM SERPL-SCNC: 3.8 MMOL/L — SIGNIFICANT CHANGE UP (ref 3.5–5.3)
PROT SERPL-MCNC: 6.9 G/DL — SIGNIFICANT CHANGE UP (ref 6–8.3)
RBC # BLD: 3.99 M/UL — LOW (ref 4.2–5.8)
RBC # FLD: 13.6 % — SIGNIFICANT CHANGE UP (ref 10.3–14.5)
SODIUM SERPL-SCNC: 141 MMOL/L — SIGNIFICANT CHANGE UP (ref 135–145)
WBC # BLD: 4.98 K/UL — SIGNIFICANT CHANGE UP (ref 3.8–10.5)
WBC # FLD AUTO: 4.98 K/UL — SIGNIFICANT CHANGE UP (ref 3.8–10.5)

## 2024-10-01 PROCEDURE — G2211 COMPLEX E/M VISIT ADD ON: CPT | Mod: NC

## 2024-10-01 PROCEDURE — 99214 OFFICE O/P EST MOD 30 MIN: CPT

## 2024-10-01 NOTE — END OF VISIT
Patient advised Cologuard was positive. Needs to follow up with a gastroenterologist for a colonoscopy. To see Dr. Janet Grimes or Dr. Parveen Bradshaw. Dr. Becky Clay  Patient states is out of town. To send provider information through [phone] He will call if he has further questions. [facility] sent with information. [Time Spent: ___ minutes] : I have spent [unfilled] minutes of time on the encounter which excludes teaching and separately reported services.

## 2024-10-01 NOTE — HISTORY OF PRESENT ILLNESS
[Disease: _____________________] : Disease: [unfilled] [T: ___] : T[unfilled] [N: ___] : N[unfilled] [AJCC Stage: ____] : AJCC Stage: [unfilled] [de-identified] : Patient presented with abdominal pain and BPR x 2 months. 8/2/2024-EGD (Dr. Funk)-erosive gastritis with path showing mild chronic gastritis.  No H. pylori. Reflux esophagitis. EG junction biopsy revealed hyperplastic squamous mucosa with mild chronic inflammation. 8/2/2024-Colonoscopy -protruding fungating circumferential nonbleeding mass of malignant appearance was found in the rectosigmoid junction at 20 cm from the anus.  The mass caused a partial obstruction.  Scope could not traverse the lesion and the exam could not be finished.  Colon mass at 20 cm with pathology revealing adenocarcinoma, moderately differentiated.  No evidence of DNA mismatch repair deficiency.  8/12/2024-CT C/A/P- Primary distal sigmoid mass. No definitive evidence of metastatic disease. Few tiny sub-4 mm pulmonary nodules, attention on follow-up.  8/15/2024-Status post sigmoidectomy with pathology revealing adenocarcinoma, grade 2, moderately differentiated, invading through the muscularis propria into the pericolonic or perirectal tissue.  No macroscopic tumor perforation.  No LVI.  No perineural invasion.  Tumor budding score intermediate (5-9).  Margins negative.  24 regional lymph nodes negative for tumor.  1 tumor deposit.  *Note : The tumor deposit is a 4 mm nodule of malignant glands in a background of dense fibrous tissue and chronic inflammation. In the absence of positive lymph nodes (as in this case),  it is categorized as pN1c.  10/1/2024-[facility]. [de-identified] : Adenocarcinoma [de-identified] : pMMR; CEA 9/16/2024 pending [de-identified] : S/P port placement. No pulmonary/GI//bony complaints at this time.  No bleeding.  No fevers. Still "getting used to" ostomy bag.

## 2024-10-01 NOTE — REVIEW OF SYSTEMS
[Diarrhea: Grade 0] : Diarrhea: Grade 0 [Negative] : Allergic/Immunologic [FreeTextEntry7] : has colostomy

## 2024-10-01 NOTE — ASSESSMENT
[FreeTextEntry1] : Lab work reviewed. Stage IIIB (T3N1c) Adenocarcinoma of the sigmoid colon, s/p sigmoidectomy 8/23/2024. Tumor pMMR. As per NCCN guidelines, patients with low risk stage III disease (including T1-3, N1) 3 months of CapeOx appeared similar to 6 months of CapeOx for 5-year overall survival (82.1% versus 81.2%) with less toxicity.  3 months of CapeOx is noninferior to 6 months of CapeOx for disease-free survival. Reviewed adjuvant chemotherapy alternatives with respective potential benefits/side effects.  Would favor 3 months of CapeOx here rather than 6 months of CapeOx or 6 months of FOLFOX, considering potential adverse effects of various protocols. Patient gave verbal and written consent for CapeOx. --clinically stable for chemo today  Patient was given the opportunity to ask questions.  His questions have been answered to his apparent satisfaction at this time.  He expresses understanding and willingness to comply with recommended follow-up.  --> Oxaliplatin 130 mg/m2 IV day 1, capecitabine 1000 mg/m2 p.o. twice daily x 14 days (~2000 mg PO BID x 14 days).  Regimen to be given every 3 weeks x 4 cycles. Cycle #1 today; RTO 3 weeks.

## 2024-10-01 NOTE — PHYSICAL EXAM
[Fully active, able to carry on all pre-disease performance without restriction] : Status 0 - Fully active, able to carry on all pre-disease performance without restriction [Normal] : affect appropriate [de-identified] : soft, NT without palpable hepatosplenomegaly; colostomy on left

## 2024-10-01 NOTE — PHYSICAL EXAM
[Fully active, able to carry on all pre-disease performance without restriction] : Status 0 - Fully active, able to carry on all pre-disease performance without restriction [Normal] : affect appropriate [de-identified] : soft, NT without palpable hepatosplenomegaly; colostomy on left

## 2024-10-01 NOTE — HISTORY OF PRESENT ILLNESS
[Disease: _____________________] : Disease: [unfilled] [T: ___] : T[unfilled] [N: ___] : N[unfilled] [AJCC Stage: ____] : AJCC Stage: [unfilled] [de-identified] : Patient presented with abdominal pain and BPR x 2 months. 8/2/2024-EGD (Dr. Funk)-erosive gastritis with path showing mild chronic gastritis.  No H. pylori. Reflux esophagitis. EG junction biopsy revealed hyperplastic squamous mucosa with mild chronic inflammation. 8/2/2024-Colonoscopy -protruding fungating circumferential nonbleeding mass of malignant appearance was found in the rectosigmoid junction at 20 cm from the anus.  The mass caused a partial obstruction.  Scope could not traverse the lesion and the exam could not be finished.  Colon mass at 20 cm with pathology revealing adenocarcinoma, moderately differentiated.  No evidence of DNA mismatch repair deficiency.  8/12/2024-CT C/A/P- Primary distal sigmoid mass. No definitive evidence of metastatic disease. Few tiny sub-4 mm pulmonary nodules, attention on follow-up.  8/15/2024-Status post sigmoidectomy with pathology revealing adenocarcinoma, grade 2, moderately differentiated, invading through the muscularis propria into the pericolonic or perirectal tissue.  No macroscopic tumor perforation.  No LVI.  No perineural invasion.  Tumor budding score intermediate (5-9).  Margins negative.  24 regional lymph nodes negative for tumor.  1 tumor deposit.  *Note : The tumor deposit is a 4 mm nodule of malignant glands in a background of dense fibrous tissue and chronic inflammation. In the absence of positive lymph nodes (as in this case),  it is categorized as pN1c.  10/1/2024-[facility]. [de-identified] : Adenocarcinoma [de-identified] : pMMR; CEA 9/16/2024 pending [de-identified] : S/P port placement. No pulmonary/GI//bony complaints at this time.  No bleeding.  No fevers. Still "getting used to" ostomy bag.

## 2024-10-02 DIAGNOSIS — R11.2 NAUSEA WITH VOMITING, UNSPECIFIED: ICD-10-CM

## 2024-10-02 DIAGNOSIS — Z51.11 ENCOUNTER FOR ANTINEOPLASTIC CHEMOTHERAPY: ICD-10-CM

## 2024-10-16 ENCOUNTER — APPOINTMENT (OUTPATIENT)
Dept: SURGERY | Facility: CLINIC | Age: 64
End: 2024-10-16
Payer: COMMERCIAL

## 2024-10-16 VITALS
RESPIRATION RATE: 16 BRPM | TEMPERATURE: 98.5 F | DIASTOLIC BLOOD PRESSURE: 64 MMHG | OXYGEN SATURATION: 99 % | SYSTOLIC BLOOD PRESSURE: 120 MMHG | HEART RATE: 73 BPM

## 2024-10-16 PROCEDURE — 99024 POSTOP FOLLOW-UP VISIT: CPT

## 2024-10-16 NOTE — PHYSICAL EXAM
[Abdominal Masses] : No abdominal masses [Abdomen Tenderness] : ~T ~M No abdominal tenderness [de-identified] : wound and colostomy heling well

## 2024-10-17 PROBLEM — E05.90 THYROTOXICOSIS, UNSPECIFIED WITHOUT THYROTOXIC CRISIS OR STORM: Chronic | Status: ACTIVE | Noted: 2024-09-26

## 2024-10-17 PROBLEM — Z87.19 PERSONAL HISTORY OF OTHER DISEASES OF THE DIGESTIVE SYSTEM: Chronic | Status: ACTIVE | Noted: 2024-09-26

## 2024-10-17 PROBLEM — E04.1 NONTOXIC SINGLE THYROID NODULE: Chronic | Status: ACTIVE | Noted: 2024-09-26

## 2024-10-17 PROBLEM — K21.9 GASTRO-ESOPHAGEAL REFLUX DISEASE WITHOUT ESOPHAGITIS: Chronic | Status: ACTIVE | Noted: 2024-09-26

## 2024-10-22 ENCOUNTER — RX RENEWAL (OUTPATIENT)
Age: 64
End: 2024-10-22

## 2024-10-22 ENCOUNTER — APPOINTMENT (OUTPATIENT)
Dept: HEMATOLOGY ONCOLOGY | Facility: CLINIC | Age: 64
End: 2024-10-22
Payer: COMMERCIAL

## 2024-10-22 ENCOUNTER — APPOINTMENT (OUTPATIENT)
Dept: INFUSION THERAPY | Facility: HOSPITAL | Age: 64
End: 2024-10-22

## 2024-10-22 ENCOUNTER — RESULT REVIEW (OUTPATIENT)
Age: 64
End: 2024-10-22

## 2024-10-22 VITALS
TEMPERATURE: 97.5 F | HEART RATE: 66 BPM | RESPIRATION RATE: 16 BRPM | SYSTOLIC BLOOD PRESSURE: 131 MMHG | OXYGEN SATURATION: 99 % | DIASTOLIC BLOOD PRESSURE: 74 MMHG

## 2024-10-22 DIAGNOSIS — C19 MALIGNANT NEOPLASM OF RECTOSIGMOID JUNCTION: ICD-10-CM

## 2024-10-22 DIAGNOSIS — Z51.11 ENCOUNTER FOR ANTINEOPLASTIC CHEMOTHERAPY: ICD-10-CM

## 2024-10-22 LAB
ALBUMIN SERPL ELPH-MCNC: 3.9 G/DL — SIGNIFICANT CHANGE UP (ref 3.3–5)
ALP SERPL-CCNC: 90 U/L — SIGNIFICANT CHANGE UP (ref 40–120)
ALT FLD-CCNC: 20 U/L — SIGNIFICANT CHANGE UP (ref 10–45)
ANION GAP SERPL CALC-SCNC: 10 MMOL/L — SIGNIFICANT CHANGE UP (ref 5–17)
AST SERPL-CCNC: 22 U/L — SIGNIFICANT CHANGE UP (ref 10–40)
BASOPHILS # BLD AUTO: 0.02 K/UL — SIGNIFICANT CHANGE UP (ref 0–0.2)
BASOPHILS NFR BLD AUTO: 0.4 % — SIGNIFICANT CHANGE UP (ref 0–2)
BILIRUB SERPL-MCNC: 0.4 MG/DL — SIGNIFICANT CHANGE UP (ref 0.2–1.2)
BUN SERPL-MCNC: 16 MG/DL — SIGNIFICANT CHANGE UP (ref 7–23)
CALCIUM SERPL-MCNC: 9.5 MG/DL — SIGNIFICANT CHANGE UP (ref 8.4–10.5)
CHLORIDE SERPL-SCNC: 103 MMOL/L — SIGNIFICANT CHANGE UP (ref 96–108)
CO2 SERPL-SCNC: 25 MMOL/L — SIGNIFICANT CHANGE UP (ref 22–31)
CREAT SERPL-MCNC: 0.9 MG/DL — SIGNIFICANT CHANGE UP (ref 0.5–1.3)
EGFR: 95 ML/MIN/1.73M2 — SIGNIFICANT CHANGE UP
EOSINOPHIL # BLD AUTO: 0.11 K/UL — SIGNIFICANT CHANGE UP (ref 0–0.5)
EOSINOPHIL NFR BLD AUTO: 2.4 % — SIGNIFICANT CHANGE UP (ref 0–6)
GLUCOSE SERPL-MCNC: 135 MG/DL — HIGH (ref 70–99)
HCT VFR BLD CALC: 33.3 % — LOW (ref 39–50)
HGB BLD-MCNC: 11.3 G/DL — LOW (ref 13–17)
IMM GRANULOCYTES NFR BLD AUTO: 0.4 % — SIGNIFICANT CHANGE UP (ref 0–0.9)
LYMPHOCYTES # BLD AUTO: 1.97 K/UL — SIGNIFICANT CHANGE UP (ref 1–3.3)
LYMPHOCYTES # BLD AUTO: 42.4 % — SIGNIFICANT CHANGE UP (ref 13–44)
MAGNESIUM SERPL-MCNC: 2.1 MG/DL — SIGNIFICANT CHANGE UP (ref 1.6–2.6)
MCHC RBC-ENTMCNC: 28.4 PG — SIGNIFICANT CHANGE UP (ref 27–34)
MCHC RBC-ENTMCNC: 33.9 G/DL — SIGNIFICANT CHANGE UP (ref 32–36)
MCV RBC AUTO: 83.7 FL — SIGNIFICANT CHANGE UP (ref 80–100)
MONOCYTES # BLD AUTO: 0.6 K/UL — SIGNIFICANT CHANGE UP (ref 0–0.9)
MONOCYTES NFR BLD AUTO: 12.9 % — SIGNIFICANT CHANGE UP (ref 2–14)
NEUTROPHILS # BLD AUTO: 1.93 K/UL — SIGNIFICANT CHANGE UP (ref 1.8–7.4)
NEUTROPHILS NFR BLD AUTO: 41.5 % — LOW (ref 43–77)
NRBC # BLD: 0 /100 WBCS — SIGNIFICANT CHANGE UP (ref 0–0)
PLATELET # BLD AUTO: 254 K/UL — SIGNIFICANT CHANGE UP (ref 150–400)
POTASSIUM SERPL-MCNC: 3.7 MMOL/L — SIGNIFICANT CHANGE UP (ref 3.5–5.3)
POTASSIUM SERPL-SCNC: 3.7 MMOL/L — SIGNIFICANT CHANGE UP (ref 3.5–5.3)
PROT SERPL-MCNC: 6.5 G/DL — SIGNIFICANT CHANGE UP (ref 6–8.3)
RBC # BLD: 3.98 M/UL — LOW (ref 4.2–5.8)
RBC # FLD: 14.5 % — SIGNIFICANT CHANGE UP (ref 10.3–14.5)
SODIUM SERPL-SCNC: 139 MMOL/L — SIGNIFICANT CHANGE UP (ref 135–145)
WBC # BLD: 4.65 K/UL — SIGNIFICANT CHANGE UP (ref 3.8–10.5)
WBC # FLD AUTO: 4.65 K/UL — SIGNIFICANT CHANGE UP (ref 3.8–10.5)

## 2024-10-22 PROCEDURE — 99214 OFFICE O/P EST MOD 30 MIN: CPT

## 2024-10-22 PROCEDURE — G2211 COMPLEX E/M VISIT ADD ON: CPT | Mod: NC

## 2024-10-22 NOTE — HISTORY OF PRESENT ILLNESS
[Disease: _____________________] : Disease: [unfilled] [T: ___] : T[unfilled] [N: ___] : N[unfilled] [AJCC Stage: ____] : AJCC Stage: [unfilled] [de-identified] : Patient presented with abdominal pain and BPR x 2 months. 8/2/2024-EGD (Dr. Castro)-erosive gastritis with path showing mild chronic gastritis.  No H. pylori. Reflux esophagitis. EG junction biopsy revealed hyperplastic squamous mucosa with mild chronic inflammation. 8/2/2024-Colonoscopy -protruding fungating circumferential nonbleeding mass of malignant appearance was found in the rectosigmoid junction at 20 cm from the anus.  The mass caused a partial obstruction.  Scope could not traverse the lesion and the exam could not be finished.  Colon mass at 20 cm with pathology revealing adenocarcinoma, moderately differentiated.  No evidence of DNA mismatch repair deficiency.  8/12/2024-CT C/A/P- Primary distal sigmoid mass. No definitive evidence of metastatic disease. Few tiny sub-4 mm pulmonary nodules, attention on follow-up.  8/15/2024-Status post sigmoidectomy with pathology revealing adenocarcinoma, grade 2, moderately differentiated, invading through the muscularis propria into the pericolonic or perirectal tissue.  No macroscopic tumor perforation.  No LVI.  No perineural invasion.  Tumor budding score intermediate (5-9).  Margins negative.  24 regional lymph nodes negative for tumor.  1 tumor deposit.  *Note : The tumor deposit is a 4 mm nodule of malignant glands in a background of dense fibrous tissue and chronic inflammation. In the absence of positive lymph nodes (as in this case),  it is categorized as pN1c.  10/1/2024-[facility]. [de-identified] : Adenocarcinoma [de-identified] : pMMR; CEA 9/16/2024 pending [de-identified] : States feels well. Had transient diarrhea with last treatment, resolved on its own. No pulmonary/GI//bony complaints at this time.  No bleeding.  No fevers.

## 2024-10-22 NOTE — PHYSICAL EXAM
[Fully active, able to carry on all pre-disease performance without restriction] : Status 0 - Fully active, able to carry on all pre-disease performance without restriction [Normal] : affect appropriate [de-identified] : soft, NT without palpable hepatosplenomegaly; colostomy on left

## 2024-10-22 NOTE — PHYSICAL EXAM
[Fully active, able to carry on all pre-disease performance without restriction] : Status 0 - Fully active, able to carry on all pre-disease performance without restriction [Normal] : affect appropriate [de-identified] : soft, NT without palpable hepatosplenomegaly; colostomy on left

## 2024-10-22 NOTE — ASSESSMENT
[FreeTextEntry1] : Lab work reviewed. Stage IIIB (T3N1c) Adenocarcinoma of the sigmoid colon, s/p sigmoidectomy 8/23/2024. Tumor pMMR. As per NCCN guidelines, patients with low risk stage III disease (including T1-3, N1) 3 months of CapeOx appeared similar to 6 months of CapeOx for 5-year overall survival (82.1% versus 81.2%) with less toxicity.  3 months of CapeOx is noninferior to 6 months of CapeOx for disease-free survival. Reviewed adjuvant chemotherapy alternatives with respective potential benefits/side effects.  Would favor 3 months of CapeOx here rather than 6 months of CapeOx or 6 months of FOLFOX, considering potential adverse effects of various protocols. Patient gave verbal and written consent for CapeOx-begun 10/1/2024. --clinically stable for chemo today to which patient consents  Patient was given the opportunity to ask questions.  His questions have been answered to his apparent satisfaction at this time.  He expresses understanding and willingness to comply with recommended follow-up.  --> Oxaliplatin 130 mg/m2 IV day 1, capecitabine 1000 mg/m2 p.o. twice daily x 14 days (~2000 mg PO BID x 14 days).  Regimen to be given every 3 weeks x 4 cycles. Cycle #2 today; RTO 3 weeks.

## 2024-10-22 NOTE — HISTORY OF PRESENT ILLNESS
[Disease: _____________________] : Disease: [unfilled] [T: ___] : T[unfilled] [N: ___] : N[unfilled] [AJCC Stage: ____] : AJCC Stage: [unfilled] [de-identified] : Patient presented with abdominal pain and BPR x 2 months. 8/2/2024-EGD (Dr. Castro)-erosive gastritis with path showing mild chronic gastritis.  No H. pylori. Reflux esophagitis. EG junction biopsy revealed hyperplastic squamous mucosa with mild chronic inflammation. 8/2/2024-Colonoscopy -protruding fungating circumferential nonbleeding mass of malignant appearance was found in the rectosigmoid junction at 20 cm from the anus.  The mass caused a partial obstruction.  Scope could not traverse the lesion and the exam could not be finished.  Colon mass at 20 cm with pathology revealing adenocarcinoma, moderately differentiated.  No evidence of DNA mismatch repair deficiency.  8/12/2024-CT C/A/P- Primary distal sigmoid mass. No definitive evidence of metastatic disease. Few tiny sub-4 mm pulmonary nodules, attention on follow-up.  8/15/2024-Status post sigmoidectomy with pathology revealing adenocarcinoma, grade 2, moderately differentiated, invading through the muscularis propria into the pericolonic or perirectal tissue.  No macroscopic tumor perforation.  No LVI.  No perineural invasion.  Tumor budding score intermediate (5-9).  Margins negative.  24 regional lymph nodes negative for tumor.  1 tumor deposit.  *Note : The tumor deposit is a 4 mm nodule of malignant glands in a background of dense fibrous tissue and chronic inflammation. In the absence of positive lymph nodes (as in this case),  it is categorized as pN1c.  10/1/2024-[facility]. [de-identified] : Adenocarcinoma [de-identified] : pMMR; CEA 9/16/2024 pending [de-identified] : States feels well. Had transient diarrhea with last treatment, resolved on its own. No pulmonary/GI//bony complaints at this time.  No bleeding.  No fevers.

## 2024-11-07 ENCOUNTER — OUTPATIENT (OUTPATIENT)
Dept: OUTPATIENT SERVICES | Facility: HOSPITAL | Age: 64
LOS: 1 days | Discharge: HOME | End: 2024-11-07

## 2024-11-07 DIAGNOSIS — Z98.890 OTHER SPECIFIED POSTPROCEDURAL STATES: Chronic | ICD-10-CM

## 2024-11-07 DIAGNOSIS — C18.9 MALIGNANT NEOPLASM OF COLON, UNSPECIFIED: ICD-10-CM

## 2024-11-10 NOTE — PHYSICAL EXAM
[Fully active, able to carry on all pre-disease performance without restriction] : Status 0 - Fully active, able to carry on all pre-disease performance without restriction [Normal] : affect appropriate [de-identified] : soft, NT without palpable hepatosplenomegaly; colostomy on left

## 2024-11-10 NOTE — PHYSICAL EXAM
[Fully active, able to carry on all pre-disease performance without restriction] : Status 0 - Fully active, able to carry on all pre-disease performance without restriction [Normal] : affect appropriate [de-identified] : soft, NT without palpable hepatosplenomegaly; colostomy on left

## 2024-11-10 NOTE — HISTORY OF PRESENT ILLNESS
[Disease: _____________________] : Disease: [unfilled] [T: ___] : T[unfilled] [N: ___] : N[unfilled] [AJCC Stage: ____] : AJCC Stage: [unfilled] [de-identified] : Patient presented with abdominal pain and BPR x 2 months. 8/2/2024-EGD (Dr. Castro)-erosive gastritis with path showing mild chronic gastritis.  No H. pylori. Reflux esophagitis. EG junction biopsy revealed hyperplastic squamous mucosa with mild chronic inflammation. 8/2/2024-Colonoscopy -protruding fungating circumferential nonbleeding mass of malignant appearance was found in the rectosigmoid junction at 20 cm from the anus.  The mass caused a partial obstruction.  Scope could not traverse the lesion and the exam could not be finished.  Colon mass at 20 cm with pathology revealing adenocarcinoma, moderately differentiated.  No evidence of DNA mismatch repair deficiency.  8/12/2024-CT C/A/P- Primary distal sigmoid mass. No definitive evidence of metastatic disease. Few tiny sub-4 mm pulmonary nodules, attention on follow-up.  8/15/2024-Status post sigmoidectomy with pathology revealing adenocarcinoma, grade 2, moderately differentiated, invading through the muscularis propria into the pericolonic or perirectal tissue.  No macroscopic tumor perforation.  No LVI.  No perineural invasion.  Tumor budding score intermediate (5-9).  Margins negative.  24 regional lymph nodes negative for tumor.  1 tumor deposit.  *Note : The tumor deposit is a 4 mm nodule of malignant glands in a background of dense fibrous tissue and chronic inflammation. In the absence of positive lymph nodes (as in this case),  it is categorized as pN1c.  10/1/2024-[facility]. [de-identified] : Adenocarcinoma [de-identified] : pMMR; CEA 9/16/2024 pending [de-identified] :   --States feels well. Had transient diarrhea with last treatment, resolved on its own. No pulmonary/GI//bony complaints at this time.  No bleeding.  No fevers.

## 2024-11-10 NOTE — HISTORY OF PRESENT ILLNESS
[Disease: _____________________] : Disease: [unfilled] [T: ___] : T[unfilled] [N: ___] : N[unfilled] [AJCC Stage: ____] : AJCC Stage: [unfilled] [de-identified] : Patient presented with abdominal pain and BPR x 2 months. 8/2/2024-EGD (Dr. Castro)-erosive gastritis with path showing mild chronic gastritis.  No H. pylori. Reflux esophagitis. EG junction biopsy revealed hyperplastic squamous mucosa with mild chronic inflammation. 8/2/2024-Colonoscopy -protruding fungating circumferential nonbleeding mass of malignant appearance was found in the rectosigmoid junction at 20 cm from the anus.  The mass caused a partial obstruction.  Scope could not traverse the lesion and the exam could not be finished.  Colon mass at 20 cm with pathology revealing adenocarcinoma, moderately differentiated.  No evidence of DNA mismatch repair deficiency.  8/12/2024-CT C/A/P- Primary distal sigmoid mass. No definitive evidence of metastatic disease. Few tiny sub-4 mm pulmonary nodules, attention on follow-up.  8/15/2024-Status post sigmoidectomy with pathology revealing adenocarcinoma, grade 2, moderately differentiated, invading through the muscularis propria into the pericolonic or perirectal tissue.  No macroscopic tumor perforation.  No LVI.  No perineural invasion.  Tumor budding score intermediate (5-9).  Margins negative.  24 regional lymph nodes negative for tumor.  1 tumor deposit.  *Note : The tumor deposit is a 4 mm nodule of malignant glands in a background of dense fibrous tissue and chronic inflammation. In the absence of positive lymph nodes (as in this case),  it is categorized as pN1c.  10/1/2024-[facility]. [de-identified] : Adenocarcinoma [de-identified] : pMMR; CEA 9/16/2024 pending [de-identified] :   --States feels well. Had transient diarrhea with last treatment, resolved on its own. No pulmonary/GI//bony complaints at this time.  No bleeding.  No fevers.

## 2024-11-10 NOTE — ASSESSMENT
[FreeTextEntry1] : Lab work reviewed. Stage IIIB (T3N1c) Adenocarcinoma of the sigmoid colon, s/p sigmoidectomy 8/23/2024. Tumor pMMR. As per NCCN guidelines, patients with low risk stage III disease (including T1-3, N1) 3 months of CapeOx appeared similar to 6 months of CapeOx for 5-year overall survival (82.1% versus 81.2%) with less toxicity.  3 months of CapeOx is noninferior to 6 months of CapeOx for disease-free survival. Reviewed adjuvant chemotherapy alternatives with respective potential benefits/side effects.  Would favor 3 months of CapeOx here rather than 6 months of CapeOx or 6 months of FOLFOX, considering potential adverse effects of various protocols. CapeOx-begun 10/1/2024. --clinically stable for chemo today   Patient was given the opportunity to ask questions.  His questions have been answered to his apparent satisfaction at this time.  He expresses understanding and willingness to comply with recommended follow-up.  --> Oxaliplatin 130 mg/m2 IV day 1, capecitabine 1000 mg/m2 p.o. twice daily x 14 days (~2000 mg PO BID x 14 days).  Regimen to be given every 3 weeks x 4 cycles. Cycle #3 today; RTO 3 weeks.

## 2024-11-12 ENCOUNTER — APPOINTMENT (OUTPATIENT)
Dept: HEMATOLOGY ONCOLOGY | Facility: CLINIC | Age: 64
End: 2024-11-12
Payer: COMMERCIAL

## 2024-11-12 ENCOUNTER — RESULT REVIEW (OUTPATIENT)
Age: 64
End: 2024-11-12

## 2024-11-12 ENCOUNTER — APPOINTMENT (OUTPATIENT)
Dept: INFUSION THERAPY | Facility: HOSPITAL | Age: 64
End: 2024-11-12

## 2024-11-12 ENCOUNTER — NON-APPOINTMENT (OUTPATIENT)
Age: 64
End: 2024-11-12

## 2024-11-12 VITALS
OXYGEN SATURATION: 99 % | HEART RATE: 77 BPM | RESPIRATION RATE: 16 BRPM | SYSTOLIC BLOOD PRESSURE: 128 MMHG | DIASTOLIC BLOOD PRESSURE: 70 MMHG | TEMPERATURE: 98.1 F

## 2024-11-12 DIAGNOSIS — Z51.11 ENCOUNTER FOR ANTINEOPLASTIC CHEMOTHERAPY: ICD-10-CM

## 2024-11-12 DIAGNOSIS — R11.2 NAUSEA WITH VOMITING, UNSPECIFIED: ICD-10-CM

## 2024-11-12 DIAGNOSIS — C19 MALIGNANT NEOPLASM OF RECTOSIGMOID JUNCTION: ICD-10-CM

## 2024-11-12 LAB
ALBUMIN SERPL ELPH-MCNC: 3.9 G/DL — SIGNIFICANT CHANGE UP (ref 3.3–5)
ALP SERPL-CCNC: 84 U/L — SIGNIFICANT CHANGE UP (ref 40–120)
ALT FLD-CCNC: 23 U/L — SIGNIFICANT CHANGE UP (ref 10–45)
ANION GAP SERPL CALC-SCNC: 14 MMOL/L — SIGNIFICANT CHANGE UP (ref 5–17)
AST SERPL-CCNC: 23 U/L — SIGNIFICANT CHANGE UP (ref 10–40)
BASOPHILS # BLD AUTO: 0.04 K/UL — SIGNIFICANT CHANGE UP (ref 0–0.2)
BASOPHILS NFR BLD AUTO: 1 % — SIGNIFICANT CHANGE UP (ref 0–2)
BILIRUB SERPL-MCNC: 0.7 MG/DL — SIGNIFICANT CHANGE UP (ref 0.2–1.2)
BUN SERPL-MCNC: 15 MG/DL — SIGNIFICANT CHANGE UP (ref 7–23)
CALCIUM SERPL-MCNC: 9.3 MG/DL — SIGNIFICANT CHANGE UP (ref 8.4–10.5)
CHLORIDE SERPL-SCNC: 102 MMOL/L — SIGNIFICANT CHANGE UP (ref 96–108)
CO2 SERPL-SCNC: 24 MMOL/L — SIGNIFICANT CHANGE UP (ref 22–31)
CREAT SERPL-MCNC: 0.95 MG/DL — SIGNIFICANT CHANGE UP (ref 0.5–1.3)
EGFR: 89 ML/MIN/1.73M2 — SIGNIFICANT CHANGE UP
ELLIPTOCYTES BLD QL SMEAR: SLIGHT — SIGNIFICANT CHANGE UP
EOSINOPHIL # BLD AUTO: 0.11 K/UL — SIGNIFICANT CHANGE UP (ref 0–0.5)
EOSINOPHIL NFR BLD AUTO: 3 % — SIGNIFICANT CHANGE UP (ref 0–6)
GLUCOSE SERPL-MCNC: 194 MG/DL — HIGH (ref 70–99)
HCT VFR BLD CALC: 34.2 % — LOW (ref 39–50)
HGB BLD-MCNC: 11.5 G/DL — LOW (ref 13–17)
LYMPHOCYTES # BLD AUTO: 1.37 K/UL — SIGNIFICANT CHANGE UP (ref 1–3.3)
LYMPHOCYTES # BLD AUTO: 39 % — SIGNIFICANT CHANGE UP (ref 13–44)
MAGNESIUM SERPL-MCNC: 2.1 MG/DL — SIGNIFICANT CHANGE UP (ref 1.6–2.6)
MCHC RBC-ENTMCNC: 28.8 PG — SIGNIFICANT CHANGE UP (ref 27–34)
MCHC RBC-ENTMCNC: 33.6 G/DL — SIGNIFICANT CHANGE UP (ref 32–36)
MCV RBC AUTO: 85.5 FL — SIGNIFICANT CHANGE UP (ref 80–100)
MONOCYTES # BLD AUTO: 0.39 K/UL — SIGNIFICANT CHANGE UP (ref 0–0.9)
MONOCYTES NFR BLD AUTO: 11 % — SIGNIFICANT CHANGE UP (ref 2–14)
NEUTROPHILS # BLD AUTO: 1.61 K/UL — LOW (ref 1.8–7.4)
NEUTROPHILS NFR BLD AUTO: 46 % — SIGNIFICANT CHANGE UP (ref 43–77)
NRBC # BLD: 0 /100 WBCS — SIGNIFICANT CHANGE UP (ref 0–0)
NRBC # BLD: SIGNIFICANT CHANGE UP /100 WBCS (ref 0–0)
PLAT MORPH BLD: NORMAL — SIGNIFICANT CHANGE UP
PLATELET # BLD AUTO: 180 K/UL — SIGNIFICANT CHANGE UP (ref 150–400)
POIKILOCYTOSIS BLD QL AUTO: SLIGHT — SIGNIFICANT CHANGE UP
POTASSIUM SERPL-MCNC: 3.8 MMOL/L — SIGNIFICANT CHANGE UP (ref 3.5–5.3)
POTASSIUM SERPL-SCNC: 3.8 MMOL/L — SIGNIFICANT CHANGE UP (ref 3.5–5.3)
PROT SERPL-MCNC: 6.4 G/DL — SIGNIFICANT CHANGE UP (ref 6–8.3)
RBC # BLD: 4 M/UL — LOW (ref 4.2–5.8)
RBC # FLD: 16.3 % — HIGH (ref 10.3–14.5)
RBC BLD AUTO: ABNORMAL
SCHISTOCYTES BLD QL AUTO: SLIGHT — SIGNIFICANT CHANGE UP
SODIUM SERPL-SCNC: 140 MMOL/L — SIGNIFICANT CHANGE UP (ref 135–145)
WBC # BLD: 3.5 K/UL — LOW (ref 3.8–10.5)
WBC # FLD AUTO: 3.5 K/UL — LOW (ref 3.8–10.5)

## 2024-11-12 PROCEDURE — 99214 OFFICE O/P EST MOD 30 MIN: CPT

## 2024-11-12 PROCEDURE — G2211 COMPLEX E/M VISIT ADD ON: CPT | Mod: NC

## 2024-11-18 ENCOUNTER — RX RENEWAL (OUTPATIENT)
Age: 64
End: 2024-11-18

## 2024-12-03 ENCOUNTER — APPOINTMENT (OUTPATIENT)
Dept: HEMATOLOGY ONCOLOGY | Facility: CLINIC | Age: 64
End: 2024-12-03
Payer: COMMERCIAL

## 2024-12-03 ENCOUNTER — APPOINTMENT (OUTPATIENT)
Dept: INFUSION THERAPY | Facility: HOSPITAL | Age: 64
End: 2024-12-03

## 2024-12-03 ENCOUNTER — RESULT REVIEW (OUTPATIENT)
Age: 64
End: 2024-12-03

## 2024-12-03 VITALS
TEMPERATURE: 98.1 F | OXYGEN SATURATION: 97 % | SYSTOLIC BLOOD PRESSURE: 125 MMHG | RESPIRATION RATE: 16 BRPM | DIASTOLIC BLOOD PRESSURE: 73 MMHG | HEART RATE: 80 BPM

## 2024-12-03 DIAGNOSIS — Z51.11 ENCOUNTER FOR ANTINEOPLASTIC CHEMOTHERAPY: ICD-10-CM

## 2024-12-03 LAB
ALBUMIN SERPL ELPH-MCNC: 3.9 G/DL — SIGNIFICANT CHANGE UP (ref 3.3–5)
ALP SERPL-CCNC: 109 U/L — SIGNIFICANT CHANGE UP (ref 40–120)
ALT FLD-CCNC: 24 U/L — SIGNIFICANT CHANGE UP (ref 10–45)
ANION GAP SERPL CALC-SCNC: 12 MMOL/L — SIGNIFICANT CHANGE UP (ref 5–17)
AST SERPL-CCNC: 28 U/L — SIGNIFICANT CHANGE UP (ref 10–40)
BASOPHILS # BLD AUTO: 0.04 K/UL — SIGNIFICANT CHANGE UP (ref 0–0.2)
BASOPHILS NFR BLD AUTO: 1.1 % — SIGNIFICANT CHANGE UP (ref 0–2)
BILIRUB SERPL-MCNC: 0.6 MG/DL — SIGNIFICANT CHANGE UP (ref 0.2–1.2)
BUN SERPL-MCNC: 17 MG/DL — SIGNIFICANT CHANGE UP (ref 7–23)
CALCIUM SERPL-MCNC: 9.8 MG/DL — SIGNIFICANT CHANGE UP (ref 8.4–10.5)
CEA SERPL-MCNC: 2.6 NG/ML — SIGNIFICANT CHANGE UP (ref 0–3.8)
CHLORIDE SERPL-SCNC: 103 MMOL/L — SIGNIFICANT CHANGE UP (ref 96–108)
CO2 SERPL-SCNC: 26 MMOL/L — SIGNIFICANT CHANGE UP (ref 22–31)
CREAT SERPL-MCNC: 1.12 MG/DL — SIGNIFICANT CHANGE UP (ref 0.5–1.3)
EGFR: 73 ML/MIN/1.73M2 — SIGNIFICANT CHANGE UP
EOSINOPHIL # BLD AUTO: 0.1 K/UL — SIGNIFICANT CHANGE UP (ref 0–0.5)
EOSINOPHIL NFR BLD AUTO: 2.8 % — SIGNIFICANT CHANGE UP (ref 0–6)
GLUCOSE SERPL-MCNC: 137 MG/DL — HIGH (ref 70–99)
HCT VFR BLD CALC: 35.2 % — LOW (ref 39–50)
HGB BLD-MCNC: 11.9 G/DL — LOW (ref 13–17)
IMM GRANULOCYTES NFR BLD AUTO: 1.4 % — HIGH (ref 0–0.9)
LYMPHOCYTES # BLD AUTO: 1.62 K/UL — SIGNIFICANT CHANGE UP (ref 1–3.3)
LYMPHOCYTES # BLD AUTO: 44.9 % — HIGH (ref 13–44)
MAGNESIUM SERPL-MCNC: 2 MG/DL — SIGNIFICANT CHANGE UP (ref 1.6–2.6)
MCHC RBC-ENTMCNC: 28.7 PG — SIGNIFICANT CHANGE UP (ref 27–34)
MCHC RBC-ENTMCNC: 33.8 G/DL — SIGNIFICANT CHANGE UP (ref 32–36)
MCV RBC AUTO: 84.8 FL — SIGNIFICANT CHANGE UP (ref 80–100)
MONOCYTES # BLD AUTO: 0.55 K/UL — SIGNIFICANT CHANGE UP (ref 0–0.9)
MONOCYTES NFR BLD AUTO: 15.2 % — HIGH (ref 2–14)
NEUTROPHILS # BLD AUTO: 1.25 K/UL — LOW (ref 1.8–7.4)
NEUTROPHILS NFR BLD AUTO: 34.6 % — LOW (ref 43–77)
NRBC # BLD: 0 /100 WBCS — SIGNIFICANT CHANGE UP (ref 0–0)
PLATELET # BLD AUTO: 177 K/UL — SIGNIFICANT CHANGE UP (ref 150–400)
POTASSIUM SERPL-MCNC: 3.7 MMOL/L — SIGNIFICANT CHANGE UP (ref 3.5–5.3)
POTASSIUM SERPL-SCNC: 3.7 MMOL/L — SIGNIFICANT CHANGE UP (ref 3.5–5.3)
PROT SERPL-MCNC: 7 G/DL — SIGNIFICANT CHANGE UP (ref 6–8.3)
RBC # BLD: 4.15 M/UL — LOW (ref 4.2–5.8)
RBC # FLD: 16.4 % — HIGH (ref 10.3–14.5)
SODIUM SERPL-SCNC: 141 MMOL/L — SIGNIFICANT CHANGE UP (ref 135–145)
WBC # BLD: 3.61 K/UL — LOW (ref 3.8–10.5)
WBC # FLD AUTO: 3.61 K/UL — LOW (ref 3.8–10.5)

## 2024-12-03 PROCEDURE — 99214 OFFICE O/P EST MOD 30 MIN: CPT

## 2024-12-03 PROCEDURE — G2211 COMPLEX E/M VISIT ADD ON: CPT | Mod: NC

## 2024-12-04 ENCOUNTER — APPOINTMENT (OUTPATIENT)
Dept: INFUSION THERAPY | Facility: HOSPITAL | Age: 64
End: 2024-12-04

## 2024-12-05 ENCOUNTER — APPOINTMENT (OUTPATIENT)
Dept: INFUSION THERAPY | Facility: HOSPITAL | Age: 64
End: 2024-12-05

## 2024-12-05 DIAGNOSIS — Z51.89 ENCOUNTER FOR OTHER SPECIFIED AFTERCARE: ICD-10-CM

## 2024-12-06 ENCOUNTER — APPOINTMENT (OUTPATIENT)
Dept: INFUSION THERAPY | Facility: HOSPITAL | Age: 64
End: 2024-12-06

## 2024-12-09 ENCOUNTER — APPOINTMENT (OUTPATIENT)
Dept: SURGERY | Facility: CLINIC | Age: 64
End: 2024-12-09
Payer: COMMERCIAL

## 2024-12-09 VITALS
HEART RATE: 81 BPM | RESPIRATION RATE: 16 BRPM | TEMPERATURE: 97.3 F | OXYGEN SATURATION: 99 % | SYSTOLIC BLOOD PRESSURE: 130 MMHG | HEIGHT: 67 IN | DIASTOLIC BLOOD PRESSURE: 78 MMHG | WEIGHT: 214 LBS | BODY MASS INDEX: 33.59 KG/M2

## 2024-12-09 DIAGNOSIS — Z87.898 PERSONAL HISTORY OF OTHER SPECIFIED CONDITIONS: ICD-10-CM

## 2024-12-09 DIAGNOSIS — C19 MALIGNANT NEOPLASM OF RECTOSIGMOID JUNCTION: ICD-10-CM

## 2024-12-09 DIAGNOSIS — Z83.3 FAMILY HISTORY OF DIABETES MELLITUS: ICD-10-CM

## 2024-12-09 DIAGNOSIS — Z87.19 PERSONAL HISTORY OF OTHER DISEASES OF THE DIGESTIVE SYSTEM: ICD-10-CM

## 2024-12-09 DIAGNOSIS — E11.9 TYPE 2 DIABETES MELLITUS W/OUT COMPLICATIONS: ICD-10-CM

## 2024-12-09 DIAGNOSIS — K21.9 GASTRO-ESOPHAGEAL REFLUX DISEASE W/OUT ESOPHAGITIS: ICD-10-CM

## 2024-12-09 DIAGNOSIS — R39.198 OTHER DIFFICULTIES WITH MICTURITION: ICD-10-CM

## 2024-12-09 DIAGNOSIS — I16.0 HYPERTENSIVE URGENCY: ICD-10-CM

## 2024-12-09 DIAGNOSIS — I10 ESSENTIAL (PRIMARY) HYPERTENSION: ICD-10-CM

## 2024-12-09 PROCEDURE — 99214 OFFICE O/P EST MOD 30 MIN: CPT

## 2024-12-09 NOTE — HISTORY OF PRESENT ILLNESS
[de-identified] : The patient is almost finished with chemotherapy. He is eating well and only has occasional nausea without any vomiting. The colostomy is functioning well.

## 2024-12-09 NOTE — ASSESSMENT
[FreeTextEntry1] : discussion regarding all options and risks To undergo PET/T within one month Return to office in two months Probable closure of Salazar colostomy in 4-6 months All lab values and imaging studies reviewed Discussed with Medicine

## 2024-12-09 NOTE — PHYSICAL EXAM
[Normal Breath Sounds] : Normal breath sounds [Normal Heart Sounds] : normal heart sounds [Normal Rate and Rhythm] : normal rate and rhythm [Abdominal Masses] : No abdominal masses [Abdomen Tenderness] : ~T ~M No abdominal tenderness [No Rash or Lesion] : No rash or lesion [de-identified] : nl [de-identified] : nl [de-identified] : stoma is healthy [de-identified] : nl

## 2025-01-07 ENCOUNTER — APPOINTMENT (OUTPATIENT)
Dept: CT IMAGING | Facility: HOSPITAL | Age: 65
End: 2025-01-07
Payer: COMMERCIAL

## 2025-01-07 ENCOUNTER — OUTPATIENT (OUTPATIENT)
Dept: OUTPATIENT SERVICES | Facility: HOSPITAL | Age: 65
LOS: 1 days | End: 2025-01-07
Payer: COMMERCIAL

## 2025-01-07 DIAGNOSIS — Z98.890 OTHER SPECIFIED POSTPROCEDURAL STATES: Chronic | ICD-10-CM

## 2025-01-07 DIAGNOSIS — C19 MALIGNANT NEOPLASM OF RECTOSIGMOID JUNCTION: ICD-10-CM

## 2025-01-07 PROCEDURE — 71260 CT THORAX DX C+: CPT | Mod: 26

## 2025-01-07 PROCEDURE — 74177 CT ABD & PELVIS W/CONTRAST: CPT | Mod: 26

## 2025-01-07 PROCEDURE — 74177 CT ABD & PELVIS W/CONTRAST: CPT

## 2025-01-07 PROCEDURE — 71260 CT THORAX DX C+: CPT

## 2025-01-22 ENCOUNTER — OUTPATIENT (OUTPATIENT)
Dept: OUTPATIENT SERVICES | Facility: HOSPITAL | Age: 65
LOS: 1 days | Discharge: HOME | End: 2025-01-22

## 2025-01-22 DIAGNOSIS — Z98.890 OTHER SPECIFIED POSTPROCEDURAL STATES: Chronic | ICD-10-CM

## 2025-01-22 DIAGNOSIS — C18.9 MALIGNANT NEOPLASM OF COLON, UNSPECIFIED: ICD-10-CM

## 2025-01-27 ENCOUNTER — RESULT REVIEW (OUTPATIENT)
Age: 65
End: 2025-01-27

## 2025-01-27 ENCOUNTER — APPOINTMENT (OUTPATIENT)
Dept: HEMATOLOGY ONCOLOGY | Facility: CLINIC | Age: 65
End: 2025-01-27
Payer: COMMERCIAL

## 2025-01-27 ENCOUNTER — APPOINTMENT (OUTPATIENT)
Dept: INFUSION THERAPY | Facility: HOSPITAL | Age: 65
End: 2025-01-27

## 2025-01-27 VITALS
OXYGEN SATURATION: 98 % | WEIGHT: 220.77 LBS | RESPIRATION RATE: 16 BRPM | TEMPERATURE: 98.4 F | BODY MASS INDEX: 34.58 KG/M2 | HEART RATE: 81 BPM | DIASTOLIC BLOOD PRESSURE: 81 MMHG | SYSTOLIC BLOOD PRESSURE: 144 MMHG

## 2025-01-27 DIAGNOSIS — C19 MALIGNANT NEOPLASM OF RECTOSIGMOID JUNCTION: ICD-10-CM

## 2025-01-27 DIAGNOSIS — Z51.11 ENCOUNTER FOR ANTINEOPLASTIC CHEMOTHERAPY: ICD-10-CM

## 2025-01-27 LAB
BASOPHILS # BLD AUTO: 0.02 K/UL — SIGNIFICANT CHANGE UP (ref 0–0.2)
BASOPHILS NFR BLD AUTO: 0.5 % — SIGNIFICANT CHANGE UP (ref 0–2)
EOSINOPHIL # BLD AUTO: 0.09 K/UL — SIGNIFICANT CHANGE UP (ref 0–0.5)
EOSINOPHIL NFR BLD AUTO: 2.3 % — SIGNIFICANT CHANGE UP (ref 0–6)
HCT VFR BLD CALC: 34.7 % — LOW (ref 39–50)
HGB BLD-MCNC: 11.3 G/DL — LOW (ref 13–17)
IMM GRANULOCYTES NFR BLD AUTO: 0 % — SIGNIFICANT CHANGE UP (ref 0–0.9)
LYMPHOCYTES # BLD AUTO: 1.91 K/UL — SIGNIFICANT CHANGE UP (ref 1–3.3)
LYMPHOCYTES # BLD AUTO: 49.7 % — HIGH (ref 13–44)
MCHC RBC-ENTMCNC: 28.8 PG — SIGNIFICANT CHANGE UP (ref 27–34)
MCHC RBC-ENTMCNC: 32.6 G/DL — SIGNIFICANT CHANGE UP (ref 32–36)
MCV RBC AUTO: 88.3 FL — SIGNIFICANT CHANGE UP (ref 80–100)
MONOCYTES # BLD AUTO: 0.45 K/UL — SIGNIFICANT CHANGE UP (ref 0–0.9)
MONOCYTES NFR BLD AUTO: 11.7 % — SIGNIFICANT CHANGE UP (ref 2–14)
NEUTROPHILS # BLD AUTO: 1.37 K/UL — LOW (ref 1.8–7.4)
NEUTROPHILS NFR BLD AUTO: 35.8 % — LOW (ref 43–77)
NRBC # BLD: 0 /100 WBCS — SIGNIFICANT CHANGE UP (ref 0–0)
NRBC BLD-RTO: 0 /100 WBCS — SIGNIFICANT CHANGE UP (ref 0–0)
PLATELET # BLD AUTO: 226 K/UL — SIGNIFICANT CHANGE UP (ref 150–400)
RBC # BLD: 3.93 M/UL — LOW (ref 4.2–5.8)
RBC # FLD: 14.4 % — SIGNIFICANT CHANGE UP (ref 10.3–14.5)
WBC # BLD: 3.84 K/UL — SIGNIFICANT CHANGE UP (ref 3.8–10.5)
WBC # FLD AUTO: 3.84 K/UL — SIGNIFICANT CHANGE UP (ref 3.8–10.5)

## 2025-01-27 PROCEDURE — 99214 OFFICE O/P EST MOD 30 MIN: CPT

## 2025-01-27 PROCEDURE — G2211 COMPLEX E/M VISIT ADD ON: CPT | Mod: NC

## 2025-01-27 NOTE — HISTORY OF PRESENT ILLNESS
[Disease: _____________________] : Disease: [unfilled] [T: ___] : T[unfilled] [N: ___] : N[unfilled] [AJCC Stage: ____] : AJCC Stage: [unfilled] [de-identified] : Patient presented with abdominal pain and BPR x 2 months. 8/2/2024-EGD (Dr. Castro)-erosive gastritis with path showing mild chronic gastritis.  No H. pylori. Reflux esophagitis. EG junction biopsy revealed hyperplastic squamous mucosa with mild chronic inflammation. 8/2/2024-Colonoscopy -protruding fungating circumferential nonbleeding mass of malignant appearance was found in the rectosigmoid junction at 20 cm from the anus.  The mass caused a partial obstruction.  Scope could not traverse the lesion and the exam could not be finished.  Colon mass at 20 cm with pathology revealing adenocarcinoma, moderately differentiated.  No evidence of DNA mismatch repair deficiency.  8/12/2024-CT C/A/P- Primary distal sigmoid mass. No definitive evidence of metastatic disease. Few tiny sub-4 mm pulmonary nodules, attention on follow-up.  8/15/2024-Status post sigmoidectomy with pathology revealing adenocarcinoma, grade 2, moderately differentiated, invading through the muscularis propria into the pericolonic or perirectal tissue.  No macroscopic tumor perforation.  No LVI.  No perineural invasion.  Tumor budding score intermediate (5-9).  Margins negative.  24 regional lymph nodes negative for tumor.  1 tumor deposit.  *Note : The tumor deposit is a 4 mm nodule of malignant glands in a background of dense fibrous tissue and chronic inflammation. In the absence of positive lymph nodes (as in this case),  it is categorized as pN1c.  12/3/2024-completed [facility]Ox. x 3 months. [de-identified] : Adenocarcinoma [de-identified] : pMMR; CEA 9/16/2024 pending [de-identified] : --+Fatigue. Continues to work. No pulmonary//bony complaints at this time.  No bleeding.  No fevers.  No falls or dropping things reported.

## 2025-01-27 NOTE — HISTORY OF PRESENT ILLNESS
[Disease: _____________________] : Disease: [unfilled] [T: ___] : T[unfilled] [N: ___] : N[unfilled] [AJCC Stage: ____] : AJCC Stage: [unfilled] [de-identified] : Patient presented with abdominal pain and BPR x 2 months. 8/2/2024-EGD (Dr. Castro)-erosive gastritis with path showing mild chronic gastritis.  No H. pylori. Reflux esophagitis. EG junction biopsy revealed hyperplastic squamous mucosa with mild chronic inflammation. 8/2/2024-Colonoscopy -protruding fungating circumferential nonbleeding mass of malignant appearance was found in the rectosigmoid junction at 20 cm from the anus.  The mass caused a partial obstruction.  Scope could not traverse the lesion and the exam could not be finished.  Colon mass at 20 cm with pathology revealing adenocarcinoma, moderately differentiated.  No evidence of DNA mismatch repair deficiency.  8/12/2024-CT C/A/P- Primary distal sigmoid mass. No definitive evidence of metastatic disease. Few tiny sub-4 mm pulmonary nodules, attention on follow-up.  8/15/2024-Status post sigmoidectomy with pathology revealing adenocarcinoma, grade 2, moderately differentiated, invading through the muscularis propria into the pericolonic or perirectal tissue.  No macroscopic tumor perforation.  No LVI.  No perineural invasion.  Tumor budding score intermediate (5-9).  Margins negative.  24 regional lymph nodes negative for tumor.  1 tumor deposit.  *Note : The tumor deposit is a 4 mm nodule of malignant glands in a background of dense fibrous tissue and chronic inflammation. In the absence of positive lymph nodes (as in this case),  it is categorized as pN1c.  12/3/2024-completed [facility]Ox. x 3 months. [de-identified] : Adenocarcinoma [de-identified] : pMMR; CEA 9/16/2024 pending [de-identified] : --+Fatigue. Continues to work. No pulmonary//bony complaints at this time.  No bleeding.  No fevers.  No falls or dropping things reported.

## 2025-01-27 NOTE — PHYSICAL EXAM
[Fully active, able to carry on all pre-disease performance without restriction] : Status 0 - Fully active, able to carry on all pre-disease performance without restriction [Normal] : affect appropriate [de-identified] : soft, NT without palpable hepatosplenomegaly; colostomy on left

## 2025-01-27 NOTE — HISTORY OF PRESENT ILLNESS
[Disease: _____________________] : Disease: [unfilled] [T: ___] : T[unfilled] [N: ___] : N[unfilled] [AJCC Stage: ____] : AJCC Stage: [unfilled] [de-identified] : Patient presented with abdominal pain and BPR x 2 months. 8/2/2024-EGD (Dr. Castro)-erosive gastritis with path showing mild chronic gastritis.  No H. pylori. Reflux esophagitis. EG junction biopsy revealed hyperplastic squamous mucosa with mild chronic inflammation. 8/2/2024-Colonoscopy -protruding fungating circumferential nonbleeding mass of malignant appearance was found in the rectosigmoid junction at 20 cm from the anus.  The mass caused a partial obstruction.  Scope could not traverse the lesion and the exam could not be finished.  Colon mass at 20 cm with pathology revealing adenocarcinoma, moderately differentiated.  No evidence of DNA mismatch repair deficiency.  8/12/2024-CT C/A/P- Primary distal sigmoid mass. No definitive evidence of metastatic disease. Few tiny sub-4 mm pulmonary nodules, attention on follow-up.  8/15/2024-Status post sigmoidectomy with pathology revealing adenocarcinoma, grade 2, moderately differentiated, invading through the muscularis propria into the pericolonic or perirectal tissue.  No macroscopic tumor perforation.  No LVI.  No perineural invasion.  Tumor budding score intermediate (5-9).  Margins negative.  24 regional lymph nodes negative for tumor.  1 tumor deposit.  *Note : The tumor deposit is a 4 mm nodule of malignant glands in a background of dense fibrous tissue and chronic inflammation. In the absence of positive lymph nodes (as in this case),  it is categorized as pN1c.  12/3/2024-completed [facility]Ox. x 3 months. [de-identified] : Adenocarcinoma [de-identified] : pMMR; CEA 9/16/2024 pending [de-identified] : --+Fatigue. Continues to work. No pulmonary//bony complaints at this time.  No bleeding.  No fevers.  No falls or dropping things reported.

## 2025-01-27 NOTE — PHYSICAL EXAM
[Fully active, able to carry on all pre-disease performance without restriction] : Status 0 - Fully active, able to carry on all pre-disease performance without restriction [Normal] : affect appropriate [de-identified] : soft, NT without palpable hepatosplenomegaly; colostomy on left

## 2025-01-27 NOTE — PHYSICAL EXAM
[Fully active, able to carry on all pre-disease performance without restriction] : Status 0 - Fully active, able to carry on all pre-disease performance without restriction [Normal] : affect appropriate [de-identified] : soft, NT without palpable hepatosplenomegaly; colostomy on left

## 2025-01-27 NOTE — ASSESSMENT
[FreeTextEntry1] : Lab work reviewed. Stage IIIB (T3N1c) Adenocarcinoma of the sigmoid colon, s/p sigmoidectomy 8/23/2024. Tumor pMMR. As per NCCN guidelines, patients with low risk stage III disease (including T1-3, N1) 3 months of CapeOx appeared similar to 6 months of [facility]Ox for 5-year overall survival (82.1% versus 81.2%) with less toxicity.  3 months of CapeOx is noninferior to 6 months of CapeOx for disease-free survival. Reviewed adjuvant chemotherapy alternatives with respective potential benefits/side effects.  Would favor 3 months of CapeOx here rather than 6 months of CapeOx or 6 months of FOLFOX, considering potential adverse effects of various protocols. CapeOx x 3 months completed 12/3/2024 1/7/2025-CT C/A/P-No gross evidence for recurrent or metastatic disease. --clinically TIFFANY --vascular access-d/w patient having port removed-patient wishes to consider. he is agreeable to its maintenance while it remains in. --f/u with Dr. Britt regarding timing of colostomy surgery. --to f/u with endocrine MD for thyroid  Patient was given the opportunity to ask questions.  His questions have been answered to his apparent satisfaction at this time.  He expresses understanding and willingness to comply with recommended follow-up.  --> POF/labs; RTO 8 weeks

## 2025-01-28 LAB
ALBUMIN SERPL ELPH-MCNC: 4.5 G/DL — SIGNIFICANT CHANGE UP (ref 3.3–5)
ALP SERPL-CCNC: 115 U/L — SIGNIFICANT CHANGE UP (ref 40–120)
ALT FLD-CCNC: 14 U/L — SIGNIFICANT CHANGE UP (ref 10–45)
ANION GAP SERPL CALC-SCNC: 19 MMOL/L — HIGH (ref 5–17)
AST SERPL-CCNC: 24 U/L — SIGNIFICANT CHANGE UP (ref 10–40)
BILIRUB SERPL-MCNC: 0.5 MG/DL — SIGNIFICANT CHANGE UP (ref 0.2–1.2)
BUN SERPL-MCNC: 18 MG/DL — SIGNIFICANT CHANGE UP (ref 7–23)
CALCIUM SERPL-MCNC: 9.5 MG/DL — SIGNIFICANT CHANGE UP (ref 8.4–10.5)
CEA SERPL-MCNC: 2.6 NG/ML — SIGNIFICANT CHANGE UP (ref 0–3.8)
CHLORIDE SERPL-SCNC: 105 MMOL/L — SIGNIFICANT CHANGE UP (ref 96–108)
CO2 SERPL-SCNC: 19 MMOL/L — LOW (ref 22–31)
CREAT SERPL-MCNC: 1.08 MG/DL — SIGNIFICANT CHANGE UP (ref 0.5–1.3)
EGFR: 76 ML/MIN/1.73M2 — SIGNIFICANT CHANGE UP
EGFR: 76 ML/MIN/1.73M2 — SIGNIFICANT CHANGE UP
GLUCOSE SERPL-MCNC: 81 MG/DL — SIGNIFICANT CHANGE UP (ref 70–99)
INR BLD: 0.91 RATIO — SIGNIFICANT CHANGE UP (ref 0.85–1.16)
POTASSIUM SERPL-MCNC: 4.1 MMOL/L — SIGNIFICANT CHANGE UP (ref 3.5–5.3)
POTASSIUM SERPL-SCNC: 4.1 MMOL/L — SIGNIFICANT CHANGE UP (ref 3.5–5.3)
PROT SERPL-MCNC: 7.4 G/DL — SIGNIFICANT CHANGE UP (ref 6–8.3)
PROTHROM AB SERPL-ACNC: 10.8 SEC — SIGNIFICANT CHANGE UP (ref 9.9–13.4)
SODIUM SERPL-SCNC: 143 MMOL/L — SIGNIFICANT CHANGE UP (ref 135–145)

## 2025-02-10 ENCOUNTER — APPOINTMENT (OUTPATIENT)
Dept: SURGERY | Facility: CLINIC | Age: 65
End: 2025-02-10
Payer: COMMERCIAL

## 2025-02-10 VITALS
OXYGEN SATURATION: 98 % | SYSTOLIC BLOOD PRESSURE: 140 MMHG | DIASTOLIC BLOOD PRESSURE: 82 MMHG | TEMPERATURE: 97.5 F | BODY MASS INDEX: 43.19 KG/M2 | HEIGHT: 60 IN | WEIGHT: 220 LBS | HEART RATE: 55 BPM

## 2025-02-10 DIAGNOSIS — I16.0 HYPERTENSIVE URGENCY: ICD-10-CM

## 2025-02-10 DIAGNOSIS — C19 MALIGNANT NEOPLASM OF RECTOSIGMOID JUNCTION: ICD-10-CM

## 2025-02-10 DIAGNOSIS — E11.9 TYPE 2 DIABETES MELLITUS W/OUT COMPLICATIONS: ICD-10-CM

## 2025-02-10 DIAGNOSIS — Z83.3 FAMILY HISTORY OF DIABETES MELLITUS: ICD-10-CM

## 2025-02-10 DIAGNOSIS — E66.9 OBESITY, UNSPECIFIED: ICD-10-CM

## 2025-02-10 DIAGNOSIS — Z87.19 PERSONAL HISTORY OF OTHER DISEASES OF THE DIGESTIVE SYSTEM: ICD-10-CM

## 2025-02-10 DIAGNOSIS — Z87.898 PERSONAL HISTORY OF OTHER SPECIFIED CONDITIONS: ICD-10-CM

## 2025-02-10 PROCEDURE — 99214 OFFICE O/P EST MOD 30 MIN: CPT

## 2025-03-21 NOTE — ASSESSMENT
[FreeTextEntry1] : 2/10/25 Surgery note reviewed. Stage IIIB (T3N1c) Adenocarcinoma of the sigmoid colon, s/p sigmoidectomy 8/23/2024. Tumor pMMR. As per NCCN guidelines, patients with low risk stage III disease (including T1-3, N1) 3 months of [facility]Ox appeared similar to 6 months of CapeOx for 5-year overall survival (82.1% versus 81.2%) with less toxicity.  3 months of CapeOx is noninferior to 6 months of CapeOx for disease-free survival. Reviewed adjuvant chemotherapy alternatives with respective potential benefits/side effects.  Would favor 3 months of CapeOx here rather than 6 months of CapeOx or 6 months of FOLFOX, considering potential adverse effects of various protocols. CapeOx x 3 months completed 12/3/2024 1/7/2025-CT C/A/P-No gross evidence for recurrent or metastatic disease. --clinically TIFFANY --vascular access-d/w patient having port removed-patient now --f/u with Dr. Britt as planned regarding colostomy closure surgery. --to f/u with endocrine MD for thyroid  Patient was given the opportunity to ask questions.  His questions have been answered to his apparent satisfaction at this time.  He expresses understanding and willingness to comply with recommended follow-up.  --> POF/labs; RTO 3 months.

## 2025-03-21 NOTE — HISTORY OF PRESENT ILLNESS
[Disease: _____________________] : Disease: [unfilled] [T: ___] : T[unfilled] [N: ___] : N[unfilled] [AJCC Stage: ____] : AJCC Stage: [unfilled] [de-identified] : Patient presented with abdominal pain and BPR x 2 months. 8/2/2024-EGD (Dr. Castro)-erosive gastritis with path showing mild chronic gastritis.  No H. pylori. Reflux esophagitis. EG junction biopsy revealed hyperplastic squamous mucosa with mild chronic inflammation. 8/2/2024-Colonoscopy -protruding fungating circumferential nonbleeding mass of malignant appearance was found in the rectosigmoid junction at 20 cm from the anus.  The mass caused a partial obstruction.  Scope could not traverse the lesion and the exam could not be finished.  Colon mass at 20 cm with pathology revealing adenocarcinoma, moderately differentiated.  No evidence of DNA mismatch repair deficiency.  8/12/2024-CT C/A/P- Primary distal sigmoid mass. No definitive evidence of metastatic disease. Few tiny sub-4 mm pulmonary nodules, attention on follow-up.  8/15/2024-Status post sigmoidectomy with pathology revealing adenocarcinoma, grade 2, moderately differentiated, invading through the muscularis propria into the pericolonic or perirectal tissue.  No macroscopic tumor perforation.  No LVI.  No perineural invasion.  Tumor budding score intermediate (5-9).  Margins negative.  24 regional lymph nodes negative for tumor.  1 tumor deposit.  *Note : The tumor deposit is a 4 mm nodule of malignant glands in a background of dense fibrous tissue and chronic inflammation. In the absence of positive lymph nodes (as in this case),  it is categorized as pN1c.  12/3/2024-completed CapeOx. x 3 months. [de-identified] : Adenocarcinoma [de-identified] : pMMR; CEA 9/16/2024 pending [de-identified] : Closure of colostomy planned with Dr. Britt.  --+Fatigue. Continues to work. No pulmonary//bony complaints at this time.  No bleeding.  No fevers.  No falls or dropping things reported.

## 2025-03-21 NOTE — PHYSICAL EXAM
[Fully active, able to carry on all pre-disease performance without restriction] : Status 0 - Fully active, able to carry on all pre-disease performance without restriction [Normal] : affect appropriate [de-identified] : soft, NT without palpable hepatosplenomegaly; colostomy on left

## 2025-03-27 ENCOUNTER — OUTPATIENT (OUTPATIENT)
Dept: OUTPATIENT SERVICES | Facility: HOSPITAL | Age: 65
LOS: 1 days | Discharge: HOME | End: 2025-03-27

## 2025-03-27 DIAGNOSIS — C18.9 MALIGNANT NEOPLASM OF COLON, UNSPECIFIED: ICD-10-CM

## 2025-03-27 DIAGNOSIS — Z98.890 OTHER SPECIFIED POSTPROCEDURAL STATES: Chronic | ICD-10-CM

## 2025-03-31 ENCOUNTER — APPOINTMENT (OUTPATIENT)
Dept: INFUSION THERAPY | Facility: HOSPITAL | Age: 65
End: 2025-03-31

## 2025-03-31 ENCOUNTER — APPOINTMENT (OUTPATIENT)
Dept: HEMATOLOGY ONCOLOGY | Facility: CLINIC | Age: 65
End: 2025-03-31

## 2025-03-31 ENCOUNTER — NON-APPOINTMENT (OUTPATIENT)
Age: 65
End: 2025-03-31

## 2025-03-31 DIAGNOSIS — C19 MALIGNANT NEOPLASM OF RECTOSIGMOID JUNCTION: ICD-10-CM

## 2025-04-03 NOTE — HISTORY OF PRESENT ILLNESS
[de-identified] : Patient presented with abdominal pain and BPR x 2 months. 8/2/2024-EGD (Dr. Castro)-erosive gastritis with path showing mild chronic gastritis.  No H. pylori. Reflux esophagitis. EG junction biopsy revealed hyperplastic squamous mucosa with mild chronic inflammation. 8/2/2024-Colonoscopy -protruding fungating circumferential nonbleeding mass of malignant appearance was found in the rectosigmoid junction at 20 cm from the anus.  The mass caused a partial obstruction.  Scope could not traverse the lesion and the exam could not be finished.  Colon mass at 20 cm with pathology revealing adenocarcinoma, moderately differentiated.  No evidence of DNA mismatch repair deficiency.  8/12/2024-CT C/A/P- Primary distal sigmoid mass. No definitive evidence of metastatic disease. Few tiny sub-4 mm pulmonary nodules, attention on follow-up.  8/15/2024-Status post sigmoidectomy with pathology revealing adenocarcinoma, grade 2, moderately differentiated, invading through the muscularis propria into the pericolonic or perirectal tissue.  No macroscopic tumor perforation.  No LVI.  No perineural invasion.  Tumor budding score intermediate (5-9).  Margins negative.  24 regional lymph nodes negative for tumor.  1 tumor deposit.  *Note : The tumor deposit is a 4 mm nodule of malignant glands in a background of dense fibrous tissue and chronic inflammation. In the absence of positive lymph nodes (as in this case),  it is categorized as pN1c.  12/3/2024-completed [facility]Ox. x 3 months. [de-identified] : Adenocarcinoma [de-identified] : pMMR; CEA 9/16/2024 pending [de-identified] : Closure of colostomy planned with Dr. Britt.  --+Fatigue. Continues to work. No pulmonary//bony complaints at this time.  No bleeding.  No fevers.  No falls or dropping things reported.

## 2025-04-03 NOTE — HISTORY OF PRESENT ILLNESS
[de-identified] : Patient presented with abdominal pain and BPR x 2 months. 8/2/2024-EGD (Dr. Castro)-erosive gastritis with path showing mild chronic gastritis.  No H. pylori. Reflux esophagitis. EG junction biopsy revealed hyperplastic squamous mucosa with mild chronic inflammation. 8/2/2024-Colonoscopy -protruding fungating circumferential nonbleeding mass of malignant appearance was found in the rectosigmoid junction at 20 cm from the anus.  The mass caused a partial obstruction.  Scope could not traverse the lesion and the exam could not be finished.  Colon mass at 20 cm with pathology revealing adenocarcinoma, moderately differentiated.  No evidence of DNA mismatch repair deficiency.  8/12/2024-CT C/A/P- Primary distal sigmoid mass. No definitive evidence of metastatic disease. Few tiny sub-4 mm pulmonary nodules, attention on follow-up.  8/15/2024-Status post sigmoidectomy with pathology revealing adenocarcinoma, grade 2, moderately differentiated, invading through the muscularis propria into the pericolonic or perirectal tissue.  No macroscopic tumor perforation.  No LVI.  No perineural invasion.  Tumor budding score intermediate (5-9).  Margins negative.  24 regional lymph nodes negative for tumor.  1 tumor deposit.  *Note : The tumor deposit is a 4 mm nodule of malignant glands in a background of dense fibrous tissue and chronic inflammation. In the absence of positive lymph nodes (as in this case),  it is categorized as pN1c.  12/3/2024-completed [facility]Ox. x 3 months. [de-identified] : Adenocarcinoma [de-identified] : pMMR; CEA 9/16/2024 pending [de-identified] : Closure of colostomy planned with Dr. Britt.  --+Fatigue. Continues to work. No pulmonary//bony complaints at this time.  No bleeding.  No fevers.  No falls or dropping things reported.

## 2025-04-17 ENCOUNTER — NON-APPOINTMENT (OUTPATIENT)
Age: 65
End: 2025-04-17

## 2025-05-10 ENCOUNTER — NON-APPOINTMENT (OUTPATIENT)
Age: 65
End: 2025-05-10

## 2025-06-06 ENCOUNTER — NON-APPOINTMENT (OUTPATIENT)
Age: 65
End: 2025-06-06

## (undated) DEVICE — DRSG CURITY GAUZE SPONGE 4 X 4" 12-PLY

## (undated) DEVICE — DRAPE IOBAN 13" X 13"

## (undated) DEVICE — STAPLER COVIDIEN ENDO GIA STANDARD HANDLE

## (undated) DEVICE — DRAPE CAMERA VIDEO 7"X96"

## (undated) DEVICE — SOL IRR POUR H2O 1500ML

## (undated) DEVICE — SNARE EXACTO COLD 9MMX230CM

## (undated) DEVICE — PACK MINOR

## (undated) DEVICE — CABLE DAC ACTIVE CORD

## (undated) DEVICE — SOLIDIFIER CANN EXPRESS 3K

## (undated) DEVICE — ENDOCUFF VISION SZ 2 LG GRN

## (undated) DEVICE — PACK CYSTO

## (undated) DEVICE — TUBING CAP SET ERBEFLO CLEVERCAP HYBRID CO2 FOR OLYMPUS SCOPES AND UCR

## (undated) DEVICE — CONTAINER FORMALIN BUFF 10% 60ML

## (undated) DEVICE — VENODYNE/SCD SLEEVE CALF LARGE

## (undated) DEVICE — CANISTER SUCTION LID GUARD 3000CC

## (undated) DEVICE — LIGASURE BLUNT TIP 37CM

## (undated) DEVICE — VENODYNE/SCD SLEEVE CALF MEDIUM

## (undated) DEVICE — KIT ENDO PROCEDURE CUST W/VLV

## (undated) DEVICE — SOL IRR BAG NS 0.9% 3000ML

## (undated) DEVICE — Device

## (undated) DEVICE — LIGASURE IMPACT

## (undated) DEVICE — POSITIONER FOAM HEAD CRADLE (PINK)

## (undated) DEVICE — POLY TRAP ETRAP

## (undated) DEVICE — PLATE NESSY 170

## (undated) DEVICE — DRAPE TOWEL BLUE 17" X 24"

## (undated) DEVICE — ELCTR PLASMA LOOP MEDIUM ANGLED 24FR 12-30 DEG

## (undated) DEVICE — SPECIMEN CONTAINER PET

## (undated) DEVICE — SOL IRR POUR H2O 1000ML

## (undated) DEVICE — PREP CHLORAPREP HI-LITE ORANGE 26ML

## (undated) DEVICE — DRAINAGE BAG URINARY 2L

## (undated) DEVICE — FOLEY HOLDER STATLOCK 2 WAY ADULT

## (undated) DEVICE — GLV 6.5 PROTEXIS (WHITE)

## (undated) DEVICE — ELCTR PLASMA BUTTON OVAL 24FR 12-30 DEG

## (undated) DEVICE — WARMING BLANKET UPPER ADULT

## (undated) DEVICE — SOL IRR POUR NS 0.9% 500ML

## (undated) DEVICE — POSITIONER STRAP ARMBOARD VELCRO TS-30

## (undated) DEVICE — LAP PAD 18 X 18"

## (undated) DEVICE — DRAPE LIGHT HANDLE COVER (GREEN)

## (undated) DEVICE — CATH NG SALEM SUMP 16FR

## (undated) DEVICE — SNARE POLYP SENS SM 13MM 240CM

## (undated) DEVICE — FORCEP RADIAL JAW 4 W NDL 2.4MM 2.8MM 240CM ORANGE DISP

## (undated) DEVICE — GLV 8 PROTEXIS (WHITE)

## (undated) DEVICE — GLV 7.5 PROTEXIS (WHITE)

## (undated) DEVICE — ELCTR BUTTON

## (undated) DEVICE — FOLEY CATH 3-WAY 22FR 30CC LATEX LUBRICATH

## (undated) DEVICE — FORCEP RADIAL JAW 4 240CM DISP